# Patient Record
Sex: FEMALE | Race: WHITE | NOT HISPANIC OR LATINO | URBAN - NONMETROPOLITAN AREA
[De-identification: names, ages, dates, MRNs, and addresses within clinical notes are randomized per-mention and may not be internally consistent; named-entity substitution may affect disease eponyms.]

---

## 2023-03-07 RX ORDER — ATORVASTATIN CALCIUM 20 MG/1
20 TABLET, FILM COATED ORAL
COMMUNITY
Start: 2022-12-27

## 2023-03-10 ENCOUNTER — CONSULT (OUTPATIENT)
Dept: PAIN MEDICINE | Facility: CLINIC | Age: 58
End: 2023-03-10

## 2023-03-10 ENCOUNTER — HOSPITAL ENCOUNTER (OUTPATIENT)
Dept: RADIOLOGY | Facility: CLINIC | Age: 58
End: 2023-03-10

## 2023-03-10 VITALS
WEIGHT: 205.4 LBS | HEIGHT: 67 IN | TEMPERATURE: 98.9 F | SYSTOLIC BLOOD PRESSURE: 114 MMHG | DIASTOLIC BLOOD PRESSURE: 62 MMHG | BODY MASS INDEX: 32.24 KG/M2 | HEART RATE: 80 BPM | RESPIRATION RATE: 20 BRPM

## 2023-03-10 DIAGNOSIS — M47.816 LUMBAR SPONDYLOSIS: Primary | ICD-10-CM

## 2023-03-10 DIAGNOSIS — M46.1 SACROILIITIS (HCC): ICD-10-CM

## 2023-03-10 DIAGNOSIS — M47.816 LUMBAR SPONDYLOSIS: ICD-10-CM

## 2023-03-10 DIAGNOSIS — M96.1 POSTLAMINECTOMY SYNDROME: ICD-10-CM

## 2023-03-10 RX ORDER — LIDOCAINE HYDROCHLORIDE 10 MG/ML
5 INJECTION, SOLUTION EPIDURAL; INFILTRATION; INTRACAUDAL; PERINEURAL ONCE
Status: CANCELLED | OUTPATIENT
Start: 2023-03-10 | End: 2023-03-10

## 2023-03-10 RX ORDER — BUPIVACAINE HCL/PF 2.5 MG/ML
4 VIAL (ML) INJECTION ONCE
Status: CANCELLED | OUTPATIENT
Start: 2023-03-10 | End: 2023-03-10

## 2023-03-10 RX ORDER — METHYLPREDNISOLONE ACETATE 80 MG/ML
80 INJECTION, SUSPENSION INTRA-ARTICULAR; INTRALESIONAL; INTRAMUSCULAR; PARENTERAL; SOFT TISSUE ONCE
Status: CANCELLED | OUTPATIENT
Start: 2023-03-10 | End: 2023-03-10

## 2023-03-10 RX ORDER — DIPHENOXYLATE HYDROCHLORIDE AND ATROPINE SULFATE 2.5; .025 MG/1; MG/1
1 TABLET ORAL DAILY
COMMUNITY

## 2023-03-10 RX ORDER — DULOXETIN HYDROCHLORIDE 30 MG/1
30 CAPSULE, DELAYED RELEASE ORAL DAILY
Qty: 30 CAPSULE | Refills: 0 | Status: SHIPPED | OUTPATIENT
Start: 2023-03-10

## 2023-03-10 NOTE — PATIENT INSTRUCTIONS
Core Strengthening Exercises   WHAT YOU NEED TO KNOW:   Your core includes the muscles of your lower back, hip, pelvis, and abdomen  Core strengthening exercises help heal and strengthen these muscles  This helps prevent another injury, and keeps your pelvis, spine, and hips in the correct position  DISCHARGE INSTRUCTIONS:   Call your doctor or physical therapist if:   You have sharp or worsening pain during exercise or at rest     You have questions or concerns about your shoulder exercises  Safety tips:  Talk to your healthcare provider before you start an exercise program  A physical therapist can teach you how to do core strengthening exercises safely  Do the exercises on a mat or firm surface  A firm surface will support your spine and prevent low back pain  Do not do these exercises on a bed  Move slowly and smoothly  Avoid fast or jerky motions  Stop if you feel pain  You may feel some discomfort at first, but you should not feel pain  Tell your provider or physical therapist if you have pain while you exercise  Regular exercise will help decrease your discomfort over time  Breathe normally during core exercises  Do not hold your breath  This may cause an increase in blood pressure and prevent muscle strengthening  Your healthcare provider will tell you when to inhale and exhale during the exercise  Begin all of your exercises with abdominal bracing  Abdominal bracing helps warm up your core muscles  You can also practice abdominal bracing throughout the day  Lie on your back with your knees bent and feet flat on the floor  Place your arms in a relaxed position beside your body  Tighten your abdominal muscles  Pull your belly button in and up toward your spine  Hold for 5 seconds  Relax your muscles  Repeat 10 times  Core strengthening exercises: Your healthcare provider will tell you how often to do these exercises   The provider will also tell you how many repetitions of each exercise you should do  Hold each exercise for 5 seconds or as directed  As you get stronger, increase your hold to 10 to 15 seconds  You can do some of these exercises on a stability ball, or with a weight  Ask your healthcare provider how to use a stability ball or weight for these exercises:  Bridging:  Lie on your back with your knees bent and feet flat on the floor  Rest your arms at your side  Tighten your buttocks, and then lift your hips 1 inch off the floor  Hold for 5 seconds  When you can do this exercise without pain for 10 seconds, increase the distance you lift your hips  A good goal is to be able to lift your hips so that your shoulders, hips, and knees are in a straight line  Dead bug:  Lie on your back with your knees bent and feet flat on the floor  Place your arms in a relaxed position beside your body  Begin with abdominal bracing  Next, raise one leg, keeping your knee bent  Hold for 5 seconds  Repeat with the other leg  When you can do this exercise without pain for 10 to 15 seconds, you may raise one straight leg and hold  Repeat with the other leg  Quadruped:  Place your hands and knees on the floor  Keep your wrists directly below your shoulders and your knees directly below your hips  Pull your belly button in toward your spine  Do not flatten or arch your back  Tighten your abdominal muscles below your belly button  Hold for 5 seconds  When you can do this exercise without pain for 10 to 15 seconds, you may extend one arm and hold  Repeat on the other side  Side bridge exercises:      Standing side bridge:  Stand next to a wall and extend one arm toward the wall  Place your palm flat on the wall with your fingers pointing upward  Begin with abdominal bracing  Next, without moving your feet, slowly bend your arm to 90 degrees  Hold for 5 seconds  Repeat on the other side   When you can do this exercise without pain for 10 to 15 seconds, you may do the bent leg side bridge on the floor  Bent leg side bridge:  Lie on one side with your legs, hips, and shoulders in a straight line  Prop yourself up onto your forearm so your elbow is directly below your shoulder  Bend your knees back to 90 degrees  Begin with abdominal bracing  Next, lift your hips and balance yourself on your forearm and knees  Hold for 5 seconds  Repeat on the other side  When you can do this exercise without pain for 10 to 15 seconds, you may do the straight leg side bridge on the floor  Straight leg side bridge:  Lie on one side with your legs, hips, and shoulders in a straight line  Prop yourself up onto your forearm so your elbow is directly below your shoulder  Begin with abdominal bracing  Lift your hips off the floor and balance yourself on your forearm and the outside of your flexed foot  Do not let your ankle bend sideways  Hold for 5 seconds  Repeat on the other side  When you can do this exercise without pain for 10 to 15 seconds, ask your healthcare provider for more advanced exercises  Superman:  Lie on your stomach  Extend your arms forward on the floor  Tighten your abdominal muscles and lift your right hand and left leg off the floor  Hold this position  Slowly return to the starting position  Tighten your abdominal muscles and lift your left hand and right leg off the floor  Hold this position  Slowly return to the starting position  Clam:  Lie on your side with your knees bent  Put your bottom arm under your head to keep your neck in line  Put your top hand on your hip to keep your pelvis from moving  Put your heels together, and keep them together during this exercise  Slowly raise your top knee toward the ceiling  Then lower your leg so your knees are together  Repeat this exercise 10 times  Then switch sides and do the exercise 10 times with the other leg  Curl up:  Lie on your back with your knees bent and feet flat on the floor   Place your hands, palms down, underneath your lower back  Next, with your elbows on the floor, lift your shoulders and chest 2 to 3 inches off the floor  Keep your head in line with your shoulders  Hold this position  Slowly return to the starting position  Straight leg raises:  Lie on your back with one leg straight  Bend the other knee and place your foot flat on the floor  Tighten your abdominal muscles  Keep your leg straight and slowly lift it straight up 6 to 12 inches off the floor  Hold this position  Lower your leg slowly  Do as many repetitions as directed on this side  Repeat with the other leg  Plank:  Lie on your stomach  Bend your elbows and place your forearms flat on the floor  Lift your chest, stomach, and knees off the floor  Make sure your elbows are below your shoulders  Your body should be in a straight line  Do not let your hips or lower back sink to the ground  Squeeze your abdominal muscles together and hold for 15 seconds  To make this exercise harder, hold for 30 seconds or lift 1 leg at a time  Bicycles:  Lie on your back  Bend both knees and bring them toward your chest  Your calves should be parallel to the floor  Place the palms of your hands on the back of your head  Straighten your right leg and keep it lifted 2 inches off the floor  Raise your head and shoulders off the floor and twist towards your left  Keep your head and shoulders lifted  Bend your right knee while you straighten your left leg  Keep your left leg 2 inches off the floor  Twist your head and chest towards the left leg  Continue to straighten 1 leg at a time and twist        Follow up with your doctor or physical therapist as directed:  Write down your questions so you remember to ask them during your visits  © Copyright Tino Sprague 2022 Information is for End User's use only and may not be sold, redistributed or otherwise used for commercial purposes  The above information is an  only   It is not intended as medical advice for individual conditions or treatments  Talk to your doctor, nurse or pharmacist before following any medical regimen to see if it is safe and effective for you  Duloxetine (By mouth)   Duloxetine ([id number]-LOX-[id number])  Treats depression, anxiety, diabetic peripheral neuropathy, fibromyalgia, and chronic muscle or bone pain  This medicine is a SNRI  Brand Name(s): Cymbalta, Drizalma Sprinkle, Irenka   There may be other brand names for this medicine  When This Medicine Should Not Be Used: This medicine is not right for everyone  Do not use it if you had an allergic reaction to duloxetine  How to Use This Medicine:   Capsule, Delayed Release Capsule  Take your medicine as directed  Your dose may need to be changed several times to find what works best for you  Delayed-release capsule: Swallow the capsule whole  Do not crush, chew, break, or open it  Do not open the Cymbalta® delayed-release capsule and sprinkle the contents on food or in liquids  If you have trouble swallowing the Aldon Ort delayed release capsule: You may open the capsule and sprinkle the contents over one tablespoon (15 mL) of applesauce  Swallow the mixture right away and do not save any of the mixture to use later  You may open the capsule and pour the contents to an all plastic catheter tip syringe and add 50 mL of water  Do not use other liquids  Gently shake it for 10 seconds, and then use it through a nasogastric tube  Rinse with additional water (about 15 mL) if needed  This medicine should come with a Medication Guide  Ask your pharmacist for a copy if you do not have one  Missed dose: Take a dose as soon as you remember  If it is almost time for your next dose, wait until then and take a regular dose  Do not take extra medicine to make up for a missed dose  Store the medicine in a closed container at room temperature, away from heat, moisture, and direct light    Drugs and Foods to Avoid: Ask your doctor or pharmacist before using any other medicine, including over-the-counter medicines, vitamins, and herbal products  Do not take duloxetine if you have used an MAO inhibitor (MAOI) within the past 14 days  Do not start taking an MAO inhibitor within 5 days of stopping duloxetine  Ask your doctor if you are not sure if you take an MAOI, including linezolid or methylene blue injection  Some medicines can affect how duloxetine works  Tell your doctor if you are using any of the following:  Buspirone, cimetidine, ciprofloxacin, enoxacin, fentanyl, fluvoxamine, lithium, Darlin's wort, theophylline, tramadol, tryptophan, warfarin  Amphetamines  Blood pressure medicine  Diuretic (water pill)  Medicine for heart rhythm problems (including flecainide, propafenone, quinidine)  Medicine to treat migraine headaches (including triptans)  NSAID pain or arthritis medicine (including aspirin, celecoxib, diclofenac, ibuprofen, naproxen)  Other medicine to treat depression or mood disorders (including amitriptyline, desipramine, fluoxetine, imipramine, nortriptyline, paroxetine)  Phenothiazine medicine (including thioridazine)  Stomach medicine (including famotidine, antacids containing aluminum or magnesium, PPIs)  Tell your doctor if you use anything else that makes you sleepy  Some examples are allergy medicine, narcotic pain medicine, and alcohol  Do not drink alcohol while you are using this medicine  Warnings While Using This Medicine:   Tell your doctor if you are pregnant or breastfeeding, or if you have kidney disease, liver disease, bleeding problems, diabetes, digestion problems, glaucoma, heart disease, high or low blood pressure, or problems with urination  Tell your doctor if you smoke or you have a history of seizures, mental health problems (including bipolar disorder, brittney), or drug or alcohol addiction    This medicine may cause the following problems:   Serious liver problems  Serotonin syndrome, when used with certain medicines  Increased risk of bleeding problems  Serious skin reactions, including erythema multiforme, Cerrato-Frank syndrome  Low sodium levels in the blood  Sexual problems  This medicine can increase thoughts of suicide  Tell your doctor right away if you start to feel depressed and have thoughts about hurting yourself  This medicine may cause blurred vision, dizziness, drowsiness, trouble with thinking, or trouble with controlling body movements, which may lead to falls, fractures, or other injuries  Do not drive or do anything that could be dangerous until you know how this medicine affects you  Stand up slowly to avoid falls  Do not stop using this medicine suddenly  Your doctor will need to slowly decrease your dose before you stop it completely  Your doctor will check your progress and the effects of this medicine at regular visits  Keep all appointments  Keep all medicine out of the reach of children  Never share your medicine with anyone  Possible Side Effects While Using This Medicine:   Call your doctor right away if you notice any of these side effects:   Allergic reaction: Itching or hives, swelling in your face or hands, swelling or tingling in your mouth or throat, chest tightness, trouble breathing  Anxiety, restlessness, fever, fast heartbeat, sweating, muscle spasms, diarrhea, seeing or hearing things that are not there  Blistering, peeling, red skin rash  Confusion, weakness, muscle twitching  Dark urine or pale stools, nausea, vomiting, loss of appetite, stomach pain, yellow skin or eyes  Decrease in how much or how often you urinate  Decrease in sexual ability, desire, drive, or performance  Eye pain, vision changes, seeing halos around lights  Feeling more energetic than usual  Lightheadedness, dizziness, fainting  Unusual moods or behaviors, worsening depression, thoughts about hurting yourself, trouble sleeping  Unusual bleeding or bruising  If you notice these less serious side effects, talk with your doctor:   Decrease in appetite or weight  Dry mouth, constipation, mild nausea  Headache  Unusual drowsiness, sleepiness, or tiredness  If you notice other side effects that you think are caused by this medicine, tell your doctor  Call your doctor for medical advice about side effects  You may report side effects to FDA at 2-441-FDA-0466  © Copyright Brandy Manner 2022 Information is for End User's use only and may not be sold, redistributed or otherwise used for commercial purposes  The above information is an  only  It is not intended as medical advice for individual conditions or treatments  Talk to your doctor, nurse or pharmacist before following any medical regimen to see if it is safe and effective for you

## 2023-03-10 NOTE — PROGRESS NOTES
Assessment  1  Lumbar spondylosis  -     DULoxetine (CYMBALTA) 30 mg delayed release capsule; Take 1 capsule (30 mg total) by mouth daily  -     MRI lumbar spine without contrast; Future; Expected date: 03/10/2023  -     XR spine lumbar complete w bending minimum 6 views; Future; Expected date: 03/10/2023    2  Postlaminectomy syndrome  -     XR spine lumbar complete w bending minimum 6 views; Future; Expected date: 03/10/2023    3  Sacroiliitis (HCC)    Axial left low back pain described primarily by arthritic features  Aching, nagging, indolent, stabbing, throbbing features in axial left low back without radicular components  5/5 strength bilaterally, negative SLR  Positive facet loading maneuvers in lumbar spine elicited pain, positive tenderness to palpation over lumbar paraspinal muscles  Positive iram's, gaenslen's, SIJ loading left sided as well as ritesh finger and compression test   Prior L4-L5 fusion in 2016  Currently she is neurologically intact without gait instability, saddle anesthesia or bowel/bladder abnormality  Risks, benefits alternative to Left SIJ injection thoroughly discussed with patient  Handouts provided questions answered to patient satisfaction  Will additionally obtain imaging to rule out hardware instability or new disc degeneration at L5-S1  Plan  -Left SIJ injection; f/u 2 weeks post procedure  -xray, MRI lumbar spine noncontrast; will f/u result with patient  -duloxetine 30mg/day ordered for patient; counseled regarding sedative effects of taking this medication and provided up titration calendar  Counseled not to take medication while driving or operating heavy machinery/using stairs  -Physician directed home exercise plan as per [facility] demonstrated and handouts provided that patient plans to participate with for 1 hour, twice a week for the next 6 weeks  There are risks associated with opioid medications, including dependence, addiction and tolerance   The patient understands and agrees to use these medications only as prescribed  Potential side effects of the medications include, but are not limited to, constipation, drowsiness, addiction, impaired judgment and risk of fatal overdose if not taken as prescribed  The patient was warned against driving while taking sedation medications or operating heavy machinery  The patient voiced understanding  Sharing medications is a felony  At this point in time, the patient is showing no signs of addiction, abuse, diversion or suicidal ideation  [place] Prescription Drug Monitoring Program report was reviewed and was appropriate      Complete risks and benefits including bleeding, infection, tissue reaction, nerve injury and allergic reaction were discussed  The approach was demonstrated using models and literature was provided  Verbal and written consent was obtained  My impressions and treatment recommendations were discussed in detail with the patient who verbalized understanding and had no further questions  Discharge instructions were provided  I personally saw and examined the patient and I agree with the above discussed plan of care  New Medications Ordered This Visit   Medications   • atorvastatin (LIPITOR) 20 mg tablet     Sig: Take 20 mg by mouth   • rivaroxaban (XARELTO) 10 mg tablet     Sig: Take 1 tablet by mouth daily   • multivitamin (THERAGRAN) TABS     Sig: Take 1 tablet by mouth daily   • DULoxetine (CYMBALTA) 30 mg delayed release capsule     Sig: Take 1 capsule (30 mg total) by mouth daily     Dispense:  30 capsule     Refill:  0       History of Present Illness    Dylan Mello is a 61 y o  female with pmhx of factor V leiden deficiency on xarelto (DVT, PE in 2014, prior L4-L5 fusion in presenting with chronic left low back pain described primarily as arthritic in nature  She describes 8/10 low back pain that is worse in the mornings and worse at the end of the day    The pain is characterized by achy, nagging, indolent, crampy, stabbing pain in her left sided axial low back  The patient describes that the pain is worse with standing for long periods of time on hard surfaces as well as with walking  The patient is a very active individual and feels as though this pain compromises his/her participation with independent activities of daily living  The pain can be debilitating at times and contribute to significant disability, compromising overall activity and independent activities of daily living  She has tried physical therapy with limited relief of symptoms  Medications the patient has tried in the past include nsaids, tylenol  She describes no radicular symptoms and has good strength  She denies any weakness numbness or paresthesias  The patient denies any bowel or bladder dysfunction as well, saddle anesthesia or gait instability  I have personally reviewed and/or updated the patient's past medical history, past surgical history, family history, social history, current medications, allergies, and vital signs today  Review of Systems   Constitutional: Positive for activity change  HENT: Negative  Eyes: Negative  Respiratory: Negative  Cardiovascular: Negative  Gastrointestinal: Negative  Endocrine: Negative  Genitourinary: Negative  Musculoskeletal: Positive for arthralgias, back pain and myalgias  Negative for gait problem  Skin: Negative  Allergic/Immunologic: Negative  Neurological: Negative for weakness and numbness  Hematological: Negative  Psychiatric/Behavioral: Negative  All other systems reviewed and are negative  There is no problem list on file for this patient  History reviewed  No pertinent past medical history  Past Surgical History:   Procedure Laterality Date   • IR DVT THROMBOLYSIS/THROMBECTOMY LOWER EXTREMITY WITH VENOGRAM         History reviewed  No pertinent family history      Social History     Occupational History   • Not on file   Tobacco Use   • Smoking status: Never   • Smokeless tobacco: Not on file   Vaping Use   • Vaping Use: Never used   Substance and Sexual Activity   • Alcohol use: Yes     Comment: social   • Drug use: Never   • Sexual activity: Not on file       Current Outpatient Medications on File Prior to Visit   Medication Sig   • atorvastatin (LIPITOR) 20 mg tablet Take 20 mg by mouth   • multivitamin (THERAGRAN) TABS Take 1 tablet by mouth daily   • rivaroxaban (XARELTO) 10 mg tablet Take 1 tablet by mouth daily     No current facility-administered medications on file prior to visit  Allergies   Allergen Reactions   • Ampicillin Rash   • Sulfa Antibiotics Rash       Physical Exam    /62   Pulse 80   Temp 98 9 °F (37 2 °C)   Resp 20   Ht 5' 6 5" (1 689 m)   Wt 93 2 kg (205 lb 6 4 oz)   BMI 32 66 kg/m²     Constitutional: normal, well developed, well nourished, alert, in no distress and non-toxic and no overt pain behavior  and obese  Eyes: anicteric  HEENT: grossly intact  Neck: supple, symmetric, trachea midline and no masses   Pulmonary:even and unlabored  Cardiovascular:No edema or pitting edema present  Skin:Normal without rashes or lesions and well hydrated  Psychiatric:Mood and affect appropriate  Neurologic:Cranial Nerves II-XII grossly intact Sensation grossly intact; no clonus negative piper's  Reflexes 2+ and brisk  SLR negative bilaterally  Musculoskeletal:normal gait  5/5 strength bilaterally with AROM in lower extremities  Normal heel toe and tip toe walking  Significant pain with lumbar facet loading bilaterally and with lateral spine rotation  No ttp over lumbar paraspinal muscles  Positive iram's test,  gaenslen's SIJ loading left sided bilaterally      Imaging    No pertinent imaging available to review

## 2023-03-10 NOTE — H&P (VIEW-ONLY)
Assessment  1  Lumbar spondylosis  -     DULoxetine (CYMBALTA) 30 mg delayed release capsule; Take 1 capsule (30 mg total) by mouth daily  -     MRI lumbar spine without contrast; Future; Expected date: 03/10/2023  -     XR spine lumbar complete w bending minimum 6 views; Future; Expected date: 03/10/2023    2  Postlaminectomy syndrome  -     XR spine lumbar complete w bending minimum 6 views; Future; Expected date: 03/10/2023    3  Sacroiliitis (HCC)    Axial left low back pain described primarily by arthritic features  Aching, nagging, indolent, stabbing, throbbing features in axial left low back without radicular components  5/5 strength bilaterally, negative SLR  Positive facet loading maneuvers in lumbar spine elicited pain, positive tenderness to palpation over lumbar paraspinal muscles  Positive iram's, gaenslen's, SIJ loading left sided as well as ritesh finger and compression test   Prior L4-L5 fusion in 2016  Currently she is neurologically intact without gait instability, saddle anesthesia or bowel/bladder abnormality  Risks, benefits alternative to Left SIJ injection thoroughly discussed with patient  Handouts provided questions answered to patient satisfaction  Will additionally obtain imaging to rule out hardware instability or new disc degeneration at L5-S1  Plan  -Left SIJ injection; f/u 2 weeks post procedure  -xray, MRI lumbar spine noncontrast; will f/u result with patient  -duloxetine 30mg/day ordered for patient; counseled regarding sedative effects of taking this medication and provided up titration calendar  Counseled not to take medication while driving or operating heavy machinery/using stairs  -Physician directed home exercise plan as per AAOS demonstrated and handouts provided that patient plans to participate with for 1 hour, twice a week for the next 6 weeks  There are risks associated with opioid medications, including dependence, addiction and tolerance   The patient understands and agrees to use these medications only as prescribed  Potential side effects of the medications include, but are not limited to, constipation, drowsiness, addiction, impaired judgment and risk of fatal overdose if not taken as prescribed  The patient was warned against driving while taking sedation medications or operating heavy machinery  The patient voiced understanding  Sharing medications is a felony  At this point in time, the patient is showing no signs of addiction, abuse, diversion or suicidal ideation  [facility] Prescription Drug Monitoring Program report was reviewed and was appropriate      Complete risks and benefits including bleeding, infection, tissue reaction, nerve injury and allergic reaction were discussed  The approach was demonstrated using models and literature was provided  Verbal and written consent was obtained  My impressions and treatment recommendations were discussed in detail with the patient who verbalized understanding and had no further questions  Discharge instructions were provided  I personally saw and examined the patient and I agree with the above discussed plan of care  New Medications Ordered This Visit   Medications   • atorvastatin (LIPITOR) 20 mg tablet     Sig: Take 20 mg by mouth   • rivaroxaban (XARELTO) 10 mg tablet     Sig: Take 1 tablet by mouth daily   • multivitamin (THERAGRAN) TABS     Sig: Take 1 tablet by mouth daily   • DULoxetine (CYMBALTA) 30 mg delayed release capsule     Sig: Take 1 capsule (30 mg total) by mouth daily     Dispense:  30 capsule     Refill:  0       History of Present Illness    Daniela Arias is a 61 y o  female with pmhx of factor V leiden deficiency on xarelto (DVT, PE in 2014, prior L4-L5 fusion in presenting with chronic left low back pain described primarily as arthritic in nature  She describes 8/10 low back pain that is worse in the mornings and worse at the end of the day    The pain is characterized by achy, nagging, indolent, crampy, stabbing pain in her left sided axial low back  The patient describes that the pain is worse with standing for long periods of time on hard surfaces as well as with walking  The patient is a very active individual and feels as though this pain compromises his/her participation with independent activities of daily living  The pain can be debilitating at times and contribute to significant disability, compromising overall activity and independent activities of daily living  She has tried physical therapy with limited relief of symptoms  Medications the patient has tried in the past include nsaids, tylenol  She describes no radicular symptoms and has good strength  She denies any weakness numbness or paresthesias  The patient denies any bowel or bladder dysfunction as well, saddle anesthesia or gait instability  I have personally reviewed and/or updated the patient's past medical history, past surgical history, family history, social history, current medications, allergies, and vital signs today  Review of Systems   Constitutional: Positive for activity change  HENT: Negative  Eyes: Negative  Respiratory: Negative  Cardiovascular: Negative  Gastrointestinal: Negative  Endocrine: Negative  Genitourinary: Negative  Musculoskeletal: Positive for arthralgias, back pain and myalgias  Negative for gait problem  Skin: Negative  Allergic/Immunologic: Negative  Neurological: Negative for weakness and numbness  Hematological: Negative  Psychiatric/Behavioral: Negative  All other systems reviewed and are negative  There is no problem list on file for this patient  History reviewed  No pertinent past medical history  Past Surgical History:   Procedure Laterality Date   • IR DVT THROMBOLYSIS/THROMBECTOMY LOWER EXTREMITY WITH VENOGRAM         History reviewed  No pertinent family history      Social History     Occupational History   • Not on file   Tobacco Use   • Smoking status: Never   • Smokeless tobacco: Not on file   Vaping Use   • Vaping Use: Never used   Substance and Sexual Activity   • Alcohol use: Yes     Comment: social   • Drug use: Never   • Sexual activity: Not on file       Current Outpatient Medications on File Prior to Visit   Medication Sig   • atorvastatin (LIPITOR) 20 mg tablet Take 20 mg by mouth   • multivitamin (THERAGRAN) TABS Take 1 tablet by mouth daily   • rivaroxaban (XARELTO) 10 mg tablet Take 1 tablet by mouth daily     No current facility-administered medications on file prior to visit  Allergies   Allergen Reactions   • Ampicillin Rash   • Sulfa Antibiotics Rash       Physical Exam    /62   Pulse 80   Temp 98 9 °F (37 2 °C)   Resp 20   Ht 5' 6 5" (1 689 m)   Wt 93 2 kg (205 lb 6 4 oz)   BMI 32 66 kg/m²     Constitutional: normal, well developed, well nourished, alert, in no distress and non-toxic and no overt pain behavior  and obese  Eyes: anicteric  HEENT: grossly intact  Neck: supple, symmetric, trachea midline and no masses   Pulmonary:even and unlabored  Cardiovascular:No edema or pitting edema present  Skin:Normal without rashes or lesions and well hydrated  Psychiatric:Mood and affect appropriate  Neurologic:Cranial Nerves II-XII grossly intact Sensation grossly intact; no clonus negative piper's  Reflexes 2+ and brisk  SLR negative bilaterally  Musculoskeletal:normal gait  5/5 strength bilaterally with AROM in lower extremities  Normal heel toe and tip toe walking  Significant pain with lumbar facet loading bilaterally and with lateral spine rotation  No ttp over lumbar paraspinal muscles  Positive iram's test,  gaenslen's SIJ loading left sided bilaterally      Imaging    No pertinent imaging available to review

## 2023-03-13 ENCOUNTER — TELEPHONE (OUTPATIENT)
Dept: PAIN MEDICINE | Facility: CLINIC | Age: 58
End: 2023-03-13

## 2023-03-13 NOTE — TELEPHONE ENCOUNTER
Caller: [facility]# [phone]    Reason: Please fax MRI referral    Fax# [phone]

## 2023-03-15 ENCOUNTER — TELEPHONE (OUTPATIENT)
Dept: RADIOLOGY | Facility: CLINIC | Age: 58
End: 2023-03-15

## 2023-03-15 NOTE — DISCHARGE INSTR - AVS FIRST PAGE
YOUR 2 WEEK FOLLOW UP HAS BEEN SCHEDULED; IF YOU WISH TO CHANGE THE FOLLOW UP, PLEASE CALL THE [facility] AT [facility]: [phone]  Sacroiliac Joint Injection   WHAT YOU NEED TO KNOW:   A sacroiliac (SI) joint injection is done to diagnose or treat pain from sacroiliac joint syndrome  The pain caused by this syndrome may be felt in your lower back, buttocks, groin, and your thigh  DISCHARGE INSTRUCTIONS:   Seek care immediately if:   You have a fever  You have increased redness, or swelling around the injection site  You have drainage from the injection site  You are not able to walk or move your leg  Contact your healthcare provider if:   Your pain does not get better within 5 days  You have new symptoms  You have questions or concerns about your condition or care  Self-care:   Drink liquids as directed  Liquids will help flush the contrast material out of your body  Ask how much liquid to drink and which liquids are best for you  Apply ice to your injection site  Ice helps decrease swelling and pain  Use an ice pack, or put crushed ice in a plastic bag  Cover it with a towel and place it on your low back for 15 to 20 minutes every hour or as directed  Do not take a bath or get into a hot tub after your procedure  Take a shower instead  Soaking your puncture site in a bath or hot tub increases your risk for infection  Limit physical activity that causes pain  Rest as needed  Ask your healthcare provider how long you should limit activity  Keep a pain diary  Write down when your pain happens, how severe it is, and any other symptoms you have with your pain  A diary will help you keep track of your pain  It may also help your healthcare provider find out what is causing your pain  Follow up with your healthcare provider as directed: You may need more injections or other treatments  Bring your pain diary to your visits   Write down your questions so you remember to ask them during your visits  © Copyright Rosalva Bernard 2022 Information is for End User's use only and may not be sold, redistributed or otherwise used for commercial purposes  The above information is an  only  It is not intended as medical advice for individual conditions or treatments  Talk to your doctor, nurse or pharmacist before following any medical regimen to see if it is safe and effective for you

## 2023-03-15 NOTE — TELEPHONE ENCOUNTER
S/w pt, informed about xray result of the lumbar spine   Pt has ov scheduled 4/10 and procedure tomorrow with VS

## 2023-03-15 NOTE — TELEPHONE ENCOUNTER
----- Message from Martin Wood MD sent at 3/15/2023 12:21 PM EDT -----  No spinal instability noted on recent xrays; will f/u after PT, MRI; please let patient know, thanks  ----- Message -----  From: Interface, Radiology Results In  Sent: 3/15/2023   6:05 AM EDT  To: Martin Wood MD

## 2023-03-16 ENCOUNTER — APPOINTMENT (OUTPATIENT)
Dept: RADIOLOGY | Facility: HOSPITAL | Age: 58
End: 2023-03-16

## 2023-03-16 ENCOUNTER — HOSPITAL ENCOUNTER (OUTPATIENT)
Facility: HOSPITAL | Age: 58
Setting detail: OUTPATIENT SURGERY
Discharge: HOME | End: 2023-03-16
Attending: ANESTHESIOLOGY | Admitting: ANESTHESIOLOGY

## 2023-03-16 VITALS
HEART RATE: 85 BPM | DIASTOLIC BLOOD PRESSURE: 84 MMHG | OXYGEN SATURATION: 96 % | HEIGHT: 67 IN | RESPIRATION RATE: 18 BRPM | WEIGHT: 205 LBS | BODY MASS INDEX: 32.18 KG/M2 | TEMPERATURE: 98 F | SYSTOLIC BLOOD PRESSURE: 141 MMHG

## 2023-03-16 RX ORDER — LIDOCAINE HYDROCHLORIDE 10 MG/ML
INJECTION, SOLUTION EPIDURAL; INFILTRATION; INTRACAUDAL; PERINEURAL AS NEEDED
Status: DISCONTINUED | OUTPATIENT
Start: 2023-03-16 | End: 2023-03-16 | Stop reason: HOSPADM

## 2023-03-16 RX ORDER — LIDOCAINE HYDROCHLORIDE 10 MG/ML
5 INJECTION, SOLUTION EPIDURAL; INFILTRATION; INTRACAUDAL; PERINEURAL ONCE
Status: DISCONTINUED | OUTPATIENT
Start: 2023-03-16 | End: 2023-03-16 | Stop reason: HOSPADM

## 2023-03-16 RX ORDER — METHYLPREDNISOLONE ACETATE 80 MG/ML
80 INJECTION, SUSPENSION INTRA-ARTICULAR; INTRALESIONAL; INTRAMUSCULAR; PARENTERAL; SOFT TISSUE ONCE
Status: COMPLETED | OUTPATIENT
Start: 2023-03-16 | End: 2023-03-16

## 2023-03-16 RX ORDER — BUPIVACAINE HCL/PF 2.5 MG/ML
4 VIAL (ML) INJECTION ONCE
Status: COMPLETED | OUTPATIENT
Start: 2023-03-16 | End: 2023-03-16

## 2023-03-16 NOTE — INTERVAL H&P NOTE
H&P reviewed  After examining the patient I find no changes in the patients condition since the H&P had been written      Vitals:    03/16/23 1048   BP: 149/78   Pulse: 96   Resp: 18   Temp: 97 8 °F (36 6 °C)   SpO2: 97%

## 2023-03-16 NOTE — PROCEDURES
Pre-procedure Diagnosis: Sacroiliitis/Sacroiliac joint dysfunction  Post-procedure Diagnosis: Sacroiliitis/Sacroiliac joint dysfunction  Operation Title(s):  1  [LEFT] Sacroiliac joint injection      2  Intraoperative fluoroscopy  Attending Surgeon:   Yesica Terry MD  Anesthesia:   Local    Indications: The patient is a 61y o  year-old female with a diagnosis of sacroiliitis/Sacroiliac joint dysfunction  The patient's history and physical exam were reviewed  The risks, benefits and alternatives to the procedure were discussed, and all questions were answered to the patient's satisfaction  The patient agreed to proceed, and written informed consent was obtained  Procedure in Detail: The patient was brought into the procedure room and placed in the prone position on the fluoroscopy table  The low back and upper buttock were prepped with chloraprep and draped in a sterile manner  AP fluoroscopy was used to visualize the [LEFT] sacroiliac joint  The fluoroscopic beam was then obliqued until the anterior and posterior margins of the joint were aligned  The inferior margin of the joint was identified and marked  The skin and subcutaneous tissues in the area were anesthetized with 1% lidocaine  A 22-gauge, 3½-inch spinal needle was advanced toward the identified point under fluoroscopic guidance  Once the targeted point was reached and the joint space was entered, negative aspiration was confirmed, and 1ml of Omnipaque 240 contrast was injected  The joint space was appropriately outlined  Then, after negative aspiration, a solution consisting of 4mL 0 25% bupivacaine and 1mL Depo-Medrol (80mg/ml) were easily injected  The needle was removed with a 1% lidocaine flush  The patient's back was cleaned and a bandage was placed over the sites of needle insertion  Disposition: The patient tolerated the procedure well and there were no apparent complications   Vital signs remained stable throughout the procedure  The patient was taken to the recovery area where written discharge instructions for the procedure were given      Estimated Blood Loss: None  Specimens Obtained: N/A

## 2023-03-16 NOTE — OP NOTE
OPERATIVE REPORT  PATIENT NAME: Jany Silverman    :  1965  MRN: [id number]  Pt Location:  GI ROOM 01    SURGERY DATE: 3/16/2023    Surgeon(s) and Role:      Gloria Leal MD - Primary    Preop Diagnosis:  Sacroiliitis ([address] ) [M46 1]    Post-Op Diagnosis Codes:     * Sacroiliitis ([address] ) [M46 1]    Procedure(s):  Left - BLOCK / INJECTION SACROILIAC    Specimen(s):  * No specimens in log *    Estimated Blood Loss:   Minimal    Drains:  * No LDAs found *    Anesthesia Type:   Local    Operative Indications:  Sacroiliitis ([address] ) [M46 1]    Operative Findings:  Contrast spread into left sacroiliac joint under fluoroscopic guidance    Complications:   None    Procedure and Technique:  Please see detailed procedure note    I was present for the entire procedure    Patient Disposition:  PACU     SIGNATURE: Odessa Bedolla MD  DATE: 2023  TIME: 11:31 AM

## 2023-03-20 NOTE — TELEPHONE ENCOUNTER
Caller: Vinny Ryan [facility]# [phone]     Reason: Harvinder Pearson calling stating MRI Auth is required and would like to speak to    Please advise      Fax# [phone]

## 2023-03-23 ENCOUNTER — TELEPHONE (OUTPATIENT)
Dept: RADIOLOGY | Facility: MEDICAL CENTER | Age: 58
End: 2023-03-23

## 2023-04-10 PROBLEM — M54.16 LUMBAR RADICULOPATHY: Status: ACTIVE | Noted: 2023-04-10

## 2023-04-25 ENCOUNTER — TELEPHONE (OUTPATIENT)
Dept: PAIN MEDICINE | Facility: CLINIC | Age: 58
End: 2023-04-25

## 2023-05-03 ENCOUNTER — OFFICE VISIT (OUTPATIENT)
Dept: PAIN MEDICINE | Facility: CLINIC | Age: 58
End: 2023-05-03

## 2023-05-03 VITALS
DIASTOLIC BLOOD PRESSURE: 78 MMHG | WEIGHT: 204.6 LBS | BODY MASS INDEX: 34.09 KG/M2 | RESPIRATION RATE: 20 BRPM | TEMPERATURE: 98.1 F | HEIGHT: 65 IN | SYSTOLIC BLOOD PRESSURE: 124 MMHG

## 2023-05-03 DIAGNOSIS — M54.16 LUMBAR RADICULOPATHY: Primary | ICD-10-CM

## 2023-05-03 NOTE — PROGRESS NOTES
Assessment  1  Lumbar radiculopathy      Greater than 80% relief of pain with improved ability to participate with IADLs after ILESI at L5-S1  Previously reported the following symptomatology:     Axial left low back pain described primarily by arthritic features  Aching, nagging, indolent, stabbing, throbbing features in axial left low back with bilateral L5 and S1 radicular (left greater than right) components  5/5 strength bilaterally, negative SLR  Positive facet loading maneuvers in lumbar spine elicited pain, positive tenderness to palpation over lumbar paraspinal muscles  Positive iram's, gaenslen's, SIJ loading left sided as well as ritesh finger and compression test   Prior L4-L5 fusion in 2016  On MRI at L3-L4 moderate central canal stenosis with mild bilateral neuroforaminal stenosis, at L4-L5 grade 1 spondylolisthesis of L4 on L5 with postsurgical changes, posterior central disc herniation  At L5-S1 a left central disc herniation leading to mild left sided neural foraminal stenosis, and small synovial cyst abutting left S1 spinal nerve  Currently she is neurologically intact without gait instability, saddle anesthesia or bowel/bladder abnormality  Risks, benefits alternative to ILESI thoroughly discussed with patient  Handouts provided questions answered to patient satisfaction  Will additionally obtain imaging to rule out hardware instability or new disc degeneration at L5-S1  Plan  -f/u prn  -gabapentin 300mg TID ordered for patient; counseled regarding sedative effects of taking this medication and provided up titration calendar  Counseled not to take medication while driving or operating heavy machinery/using stairs  -Physician directed home exercise plan as per AAOS demonstrated and handouts provided that patient plans to participate with for 1 hour, twice a week for the next 6 weeks  There are risks associated with opioid medications, including dependence, addiction and tolerance  The patient understands and agrees to use these medications only as prescribed  Potential side effects of the medications include, but are not limited to, constipation, drowsiness, addiction, impaired judgment and risk of fatal overdose if not taken as prescribed  The patient was warned against driving while taking sedation medications or operating heavy machinery  The patient voiced understanding  Sharing medications is a felony  At this point in time, the patient is showing no signs of addiction, abuse, diversion or suicidal ideation  [place] Prescription Drug Monitoring Program report was reviewed and was appropriate      Complete risks and benefits including bleeding, infection, tissue reaction, nerve injury and allergic reaction were discussed  The approach was demonstrated using models and literature was provided  Verbal and written consent was obtained  My impressions and treatment recommendations were discussed in detail with the patient who verbalized understanding and had no further questions  Discharge instructions were provided  I personally saw and examined the patient and I agree with the above discussed plan of care  No orders of the defined types were placed in this encounter  History of Present Illness    Greater than 80% relief of pain with improved ability to participate with IADLs after ILESI at L5-S1  Previously reported the following symptomatology:     Melinda Rowe is a 61 y o  female with pmhx of factor V leiden deficiency on xarelto (DVT, PE in 2014, prior L4-L5 fusion in presenting with chronic left greater than right low back pain described primarily as arthritic in nature  She describes 8/10 low back pain that is worse in the mornings and worse at the end of the day  The pain is characterized by achy, nagging, indolent, crampy, stabbing pain in her left sided axial low back    The patient describes that the pain is worse with standing for long periods of time on hard surfaces as well as with walking  The patient is a very active individual and feels as though this pain compromises her participation with independent activities of daily living  The pain can be debilitating at times and contribute to significant disability, compromising overall activity and independent activities of daily living  She has tried physical therapy with limited relief of symptoms  Medications the patient has tried in the past include nsaids, tylenol  She describes no radicular symptoms and has good strength  She endoreses pain limited weakness in bilateral L5 and S1 dermatomes accompanied by numbness and paresthesias  The patient denies any bowel or bladder dysfunction as well, saddle anesthesia or gait instability  I have personally reviewed and/or updated the patient's past medical history, past surgical history, family history, social history, current medications, allergies, and vital signs today  Review of Systems   Constitutional: Positive for activity change  HENT: Negative  Eyes: Negative  Respiratory: Negative  Cardiovascular: Negative  Gastrointestinal: Negative  Endocrine: Negative  Genitourinary: Negative  Musculoskeletal: Positive for arthralgias, back pain and myalgias  Negative for gait problem  Skin: Negative  Allergic/Immunologic: Negative  Neurological: Negative for weakness and numbness  Hematological: Negative  Psychiatric/Behavioral: Negative  All other systems reviewed and are negative        Patient Active Problem List   Diagnosis    Postlaminectomy syndrome    Lumbar spondylosis    Sacroiliitis (HCC)    Lumbar radiculopathy       Past Medical History:   Diagnosis Date    Back pain     DVT (deep vein thrombosis) in pregnancy     [facility]     High cholesterol     Pulmonary embolism (HCC)        Past Surgical History:   Procedure Laterality Date    BACK SURGERY      EPIDURAL BLOCK "INJECTION N/A 4/18/2023    Procedure: BLOCK / INJECTION EPIDURAL STEROID LUMBAR L5-S1;  Surgeon: Al Rodríguez MD;  Location: [facility];  Service: Pain Management     IR DVT THROMBOLYSIS/THROMBECTOMY LOWER EXTREMITY WITH VENOGRAM      NE INJECT SI JOINT ARTHRGRPHY&/ANES/STEROID W/RYDER Left 03/16/2023    Procedure: BLOCK / INJECTION SACROILIAC;  Surgeon: Al Rodríguez MD;  Location: [facility];  Service: Pain Management        History reviewed  No pertinent family history  Social History     Occupational History    Not on file   Tobacco Use    Smoking status: Never    Smokeless tobacco: Never   Vaping Use    Vaping Use: Never used   Substance and Sexual Activity    Alcohol use: Yes     Comment: social    Drug use: Never    Sexual activity: Not on file       Current Outpatient Medications on File Prior to Visit   Medication Sig    atorvastatin (LIPITOR) 20 mg tablet Take 20 mg by mouth    multivitamin (THERAGRAN) TABS Take 1 tablet by mouth daily    rivaroxaban (XARELTO) 10 mg tablet Take 1 tablet by mouth daily    gabapentin (NEURONTIN) 300 mg capsule Take 1 capsule (300 mg total) by mouth 3 (three) times a day (Patient not taking: Reported on 5/3/2023)     No current facility-administered medications on file prior to visit  Allergies   Allergen Reactions    Ampicillin Rash    Sulfa Antibiotics Rash       Physical Exam    /78   Temp 98 1 °F (36 7 °C)   Resp 20   Ht 5' 5\" (1 651 m)   Wt 92 8 kg (204 lb 9 6 oz)   BMI 34 05 kg/m²     Constitutional: normal, well developed, well nourished, alert, in no distress and non-toxic and no overt pain behavior   and obese  Eyes: anicteric  HEENT: grossly intact  Neck: supple, symmetric, trachea midline and no masses   Pulmonary:even and unlabored  Cardiovascular:No edema or pitting edema present  Skin:Normal without rashes or lesions and well hydrated  Psychiatric:Mood and affect appropriate  Neurologic:Cranial Nerves II-XII grossly intact " Sensation grossly intact; no clonus negative piper's  Reflexes 2+ and brisk  SLR negative bilaterally  Musculoskeletal:normal gait  5/5 strength bilaterally with AROM in lower extremities  Normal heel toe and tip toe walking  Significant pain with lumbar facet loading bilaterally and with lateral spine rotation  No ttp over lumbar paraspinal muscles  Positive iram's test,  gaenslen's SIJ loading left sided bilaterally  Imaging     On MRI at L3-L4 moderate central canal stenosis with mild bilateral neuroforaminal stenosis, at L4-L5 grade 1 spondylolisthesis of L4 on L5 with postsurgical changes, posterior central disc herniation  At L5-S1 a left central disc herniation leading to mild left sided neural foraminal stenosis, and small synovial cyst abutting left S1 spinal nerve

## 2023-05-03 NOTE — PATIENT INSTRUCTIONS
Spinal Cord Stimulator Placement   WHAT YOU NEED TO KNOW:   What do I need to know about spinal cord stimulator placement? A spinal cord stimulator (SCS) is a device used to control pain after other treatments have not worked  The SCS delivers a small amount of electrical current to your spinal cord to block pain  SCS placement surgery is done in 2 stages  In the first stage, a temporary SCS is placed and left in for about a week  In the second stage, a permanent SCS is placed if the temporary device reduced your pain  You will get a remote control to turn the pulse generator on and off and adjust the pulses  How do I prepare for surgery? Your surgeon will tell you how to prepare  He or she may tell you not to eat or drink anything after midnight on the day of surgery  Arrange to have someone drive you home when you are discharged from the hospital     Tell your surgeon about all medicines you currently take  Be sure to include any medicine that may cause you to bleed more, such as aspirin or blood thinners  Your surgeon will tell you if you need to stop any of your medicine for the surgery, and when to stop  He or she will tell you which medicines to take or not take on the day of surgery  Tell your surgeon if you have a blood disorder or had a bleeding problem  You may need blood or urine tests to check for infection and make sure your body is okay for surgery  You may also need an MRI to check your spine before your healthcare provider places the SCS  Ask your surgeon for more information about these and other tests you may need  What will happen during surgery? Temporary lead placement:      You may get general anesthesia to keep you asleep during surgery  You may get local anesthesia to numb the surgery area  Local anesthesia allows you to stay awake during the surgery so you can tell your surgeon when your pain decreases  This helps him or her make sure the SCS is in the best spot      Your surgeon will place electrical leads through a small incision or a needle inserted into your back  He or she may need to remove a small piece of bone to insert the leads along your spine  He or she will use an x-ray to make sure the leads are in the correct place  The incision will be covered with bandages  The leads will be connected to wires and attached to a pulse generator placed outside your body  Permanent lead placement:      Your surgeon will remove the temporary lead and replace it with a new, permanent lead through an incision or needle in your back  He or she will make another incision in your abdomen or buttocks  Then he or she will make a small pocket under your skin and place the SCS  Wires will be attached to the SCS  The wires will be tunneled under your skin  Your surgeon will connect the wires to the leads placed in your spine  The incisions will be closed with stitches and covered with a bandage  What should I expect after surgery? You will be taken to a room to rest until you are fully awake  Healthcare providers will monitor you closely for any problems  Do not get out of bed until your healthcare provider says it is okay  SCS settings  on the remote control can be changed to help relieve your pain  Your healthcare provider will show you how to adjust the settings  Medicines  may be given for surgery pain or to prevent a bacterial infection  Ice packs  may be used to decrease the pain from the incisions  What are the risks of spinal cord stimulator placement? The spinal cord stimulator may not work correctly and you may need to have it replaced  You may develop a headache, or your pain may get worse  Surgery may cause you to bleed or to leak spinal fluid  After surgery, you may get an infection at the incision site  You may also get a serious infection near where the leads are placed  This may cause paralysis or become life-threatening    CARE AGREEMENT:   You have the right to help plan your care  Learn about your health condition and how it may be treated  Discuss treatment options with your healthcare providers to decide what care you want to receive  You always have the right to refuse treatment  The above information is an  only  It is not intended as medical advice for individual conditions or treatments  Talk to your doctor, nurse or pharmacist before following any medical regimen to see if it is safe and effective for you  © Copyright Zulema Wilder 2022 Information is for End User's use only and may not be sold, redistributed or otherwise used for commercial purposes  Core Strengthening Exercises   WHAT YOU NEED TO KNOW:   Your core includes the muscles of your lower back, hip, pelvis, and abdomen  Core strengthening exercises help heal and strengthen these muscles  This helps prevent another injury, and keeps your pelvis, spine, and hips in the correct position  DISCHARGE INSTRUCTIONS:   Call your doctor or physical therapist if:   You have sharp or worsening pain during exercise or at rest     You have questions or concerns about your shoulder exercises  Safety tips:  Talk to your healthcare provider before you start an exercise program  A physical therapist can teach you how to do core strengthening exercises safely  Do the exercises on a mat or firm surface  A firm surface will support your spine and prevent low back pain  Do not do these exercises on a bed  Move slowly and smoothly  Avoid fast or jerky motions  Stop if you feel pain  You may feel some discomfort at first, but you should not feel pain  Tell your provider or physical therapist if you have pain while you exercise  Regular exercise will help decrease your discomfort over time  Breathe normally during core exercises  Do not hold your breath  This may cause an increase in blood pressure and prevent muscle strengthening   Your healthcare provider will tell you when to inhale and exhale during the exercise  Begin all of your exercises with abdominal bracing  Abdominal bracing helps warm up your core muscles  You can also practice abdominal bracing throughout the day  Lie on your back with your knees bent and feet flat on the floor  Place your arms in a relaxed position beside your body  Tighten your abdominal muscles  Pull your belly button in and up toward your spine  Hold for 5 seconds  Relax your muscles  Repeat 10 times  Core strengthening exercises: Your healthcare provider will tell you how often to do these exercises  The provider will also tell you how many repetitions of each exercise you should do  Hold each exercise for 5 seconds or as directed  As you get stronger, increase your hold to 10 to 15 seconds  You can do some of these exercises on a stability ball, or with a weight  Ask your healthcare provider how to use a stability ball or weight for these exercises:  Bridging:  Lie on your back with your knees bent and feet flat on the floor  Rest your arms at your side  Tighten your buttocks, and then lift your hips 1 inch off the floor  Hold for 5 seconds  When you can do this exercise without pain for 10 seconds, increase the distance you lift your hips  A good goal is to be able to lift your hips so that your shoulders, hips, and knees are in a straight line  Dead bug:  Lie on your back with your knees bent and feet flat on the floor  Place your arms in a relaxed position beside your body  Begin with abdominal bracing  Next, raise one leg, keeping your knee bent  Hold for 5 seconds  Repeat with the other leg  When you can do this exercise without pain for 10 to 15 seconds, you may raise one straight leg and hold  Repeat with the other leg  Quadruped:  Place your hands and knees on the floor  Keep your wrists directly below your shoulders and your knees directly below your hips  Pull your belly button in toward your spine   Do not flatten or arch your back  Tighten your abdominal muscles below your belly button  Hold for 5 seconds  When you can do this exercise without pain for 10 to 15 seconds, you may extend one arm and hold  Repeat on the other side  Side bridge exercises:      Standing side bridge:  Stand next to a wall and extend one arm toward the wall  Place your palm flat on the wall with your fingers pointing upward  Begin with abdominal bracing  Next, without moving your feet, slowly bend your arm to 90 degrees  Hold for 5 seconds  Repeat on the other side  When you can do this exercise without pain for 10 to 15 seconds, you may do the bent leg side bridge on the floor  Bent leg side bridge:  Lie on one side with your legs, hips, and shoulders in a straight line  Prop yourself up onto your forearm so your elbow is directly below your shoulder  Bend your knees back to 90 degrees  Begin with abdominal bracing  Next, lift your hips and balance yourself on your forearm and knees  Hold for 5 seconds  Repeat on the other side  When you can do this exercise without pain for 10 to 15 seconds, you may do the straight leg side bridge on the floor  Straight leg side bridge:  Lie on one side with your legs, hips, and shoulders in a straight line  Prop yourself up onto your forearm so your elbow is directly below your shoulder  Begin with abdominal bracing  Lift your hips off the floor and balance yourself on your forearm and the outside of your flexed foot  Do not let your ankle bend sideways  Hold for 5 seconds  Repeat on the other side  When you can do this exercise without pain for 10 to 15 seconds, ask your healthcare provider for more advanced exercises  Superman:  Lie on your stomach  Extend your arms forward on the floor  Tighten your abdominal muscles and lift your right hand and left leg off the floor  Hold this position  Slowly return to the starting position   Tighten your abdominal muscles and lift your left hand and right leg off the floor  Hold this position  Slowly return to the starting position  Clam:  Lie on your side with your knees bent  Put your bottom arm under your head to keep your neck in line  Put your top hand on your hip to keep your pelvis from moving  Put your heels together, and keep them together during this exercise  Slowly raise your top knee toward the ceiling  Then lower your leg so your knees are together  Repeat this exercise 10 times  Then switch sides and do the exercise 10 times with the other leg  Curl up:  Lie on your back with your knees bent and feet flat on the floor  Place your hands, palms down, underneath your lower back  Next, with your elbows on the floor, lift your shoulders and chest 2 to 3 inches off the floor  Keep your head in line with your shoulders  Hold this position  Slowly return to the starting position  Straight leg raises:  Lie on your back with one leg straight  Bend the other knee and place your foot flat on the floor  Tighten your abdominal muscles  Keep your leg straight and slowly lift it straight up 6 to 12 inches off the floor  Hold this position  Lower your leg slowly  Do as many repetitions as directed on this side  Repeat with the other leg  Plank:  Lie on your stomach  Bend your elbows and place your forearms flat on the floor  Lift your chest, stomach, and knees off the floor  Make sure your elbows are below your shoulders  Your body should be in a straight line  Do not let your hips or lower back sink to the ground  Squeeze your abdominal muscles together and hold for 15 seconds  To make this exercise harder, hold for 30 seconds or lift 1 leg at a time  Bicycles:  Lie on your back  Bend both knees and bring them toward your chest  Your calves should be parallel to the floor  Place the palms of your hands on the back of your head  Straighten your right leg and keep it lifted 2 inches off the floor   Raise your head and shoulders off the floor and twist towards your left  Keep your head and shoulders lifted  Bend your right knee while you straighten your left leg  Keep your left leg 2 inches off the floor  Twist your head and chest towards the left leg  Continue to straighten 1 leg at a time and twist        Follow up with your doctor or physical therapist as directed:  Write down your questions so you remember to ask them during your visits  © Copyright Frank Hernandez 2022 Information is for End User's use only and may not be sold, redistributed or otherwise used for commercial purposes  The above information is an  only  It is not intended as medical advice for individual conditions or treatments  Talk to your doctor, nurse or pharmacist before following any medical regimen to see if it is safe and effective for you

## 2023-06-30 ENCOUNTER — OFFICE VISIT (OUTPATIENT)
Dept: OBGYN CLINIC | Facility: CLINIC | Age: 58
End: 2023-06-30
Payer: COMMERCIAL

## 2023-06-30 VITALS
TEMPERATURE: 97.7 F | BODY MASS INDEX: 33.99 KG/M2 | HEIGHT: 65 IN | HEART RATE: 78 BPM | DIASTOLIC BLOOD PRESSURE: 80 MMHG | WEIGHT: 204 LBS | SYSTOLIC BLOOD PRESSURE: 120 MMHG

## 2023-06-30 DIAGNOSIS — M46.1 SACROILIITIS (HCC): Primary | ICD-10-CM

## 2023-06-30 DIAGNOSIS — M47.816 LUMBAR SPONDYLOSIS: ICD-10-CM

## 2023-06-30 PROCEDURE — 99204 OFFICE O/P NEW MOD 45 MIN: CPT | Performed by: ORTHOPAEDIC SURGERY

## 2023-06-30 NOTE — PROGRESS NOTES
ASSESSMENT/PLAN:    Diagnoses and all orders for this visit:    Sacroiliitis ([facility] [address] )    Lumbar spondylosis        Plan: I would recommend continuing treatment with Dr Maria Luisa Casiano, chiropractor or she could choose to see a spine surgeon to discuss whether a surgical intervention for her sacroiliac pain would be appropriate at this time  She has been informed that I do not see any evidence for this to be hip pathology  I would be happy to see her in follow-up if she has a change in symptoms or if it is felt that her symptoms become more consistent with hip pathology  Return if symptoms worsen or fail to improve       _____________________________________________________  CHIEF COMPLAINT:  Chief Complaint   Patient presents with   • Right Hip - Pain   • Left Hip - Pain         SUBJECTIVE:  Moreno Decker is a 61y o  year old female who presents for evaluation of bilateral lower back pain  She localizes pain to the area of the bilateral SI joint, left greater than right  She has had symptoms now for 1 year and has had physical therapy as well as SI joint injection and epidural steroid injection  She noted improvement for brief periods of time after these injections  She states that she was recently at a chiropractor's office and he had suggested that she see orthopedics for hip evaluation to determine if her hips were the source  She mentions no groin pain  She denies pain radiating distally into the lower extremities although she occasionally experiences some buttock pain  She denies lower extremity weakness or tiredness with prolonged ambulation  She does admit that pain increases with prolonged ambulation but remains focused in the lower back/SI joint region      PAST MEDICAL HISTORY:  Past Medical History:   Diagnosis Date   • Back pain    • DVT (deep vein thrombosis) in pregnancy    • Factor 5 Leiden mutation, heterozygous ([facility] [address] )    • High cholesterol    • Pulmonary embolism (H[facility])        PAST SURGICAL HISTORY:  Past Surgical History:   Procedure Laterality Date   • BACK SURGERY     • EPIDURAL BLOCK INJECTION N/A 4/18/2023    Procedure: BLOCK / INJECTION EPIDURAL STEROID LUMBAR L5-S1;  Surgeon: Anup Locke MD;  Location: [facility];  Service: Pain Management    • IR DVT THROMBOLYSIS/THROMBECTOMY LOWER EXTREMITY WITH VENOGRAM     • IA INJECT SI JOINT ARTHRGRPHY&/ANES/STEROID W/RYDER Left 03/16/2023    Procedure: BLOCK / INJECTION SACROILIAC;  Surgeon: Anup Locke MD;  Location: [facility];  Service: Pain Management        FAMILY HISTORY:  History reviewed  No pertinent family history  SOCIAL HISTORY:  Social History     Tobacco Use   • Smoking status: Never   • Smokeless tobacco: Never   Vaping Use   • Vaping Use: Never used   Substance Use Topics   • Alcohol use: Yes     Comment: social   • Drug use: Never       MEDICATIONS:    Current Outpatient Medications:   •  atorvastatin (LIPITOR) 20 mg tablet, Take 20 mg by mouth, Disp: , Rfl:   •  gabapentin (NEURONTIN) 300 mg capsule, Take 1 capsule (300 mg total) by mouth 3 (three) times a day, Disp: 90 capsule, Rfl: 0  •  multivitamin (THERAGRAN) TABS, Take 1 tablet by mouth daily, Disp: , Rfl:   •  rivaroxaban (XARELTO) 10 mg tablet, Take 1 tablet by mouth daily, Disp: , Rfl:     ALLERGIES:  Allergies   Allergen Reactions   • Ampicillin Rash   • Sulfa Antibiotics Rash       Review of systems:   Constitutional: Negative for fatigue, fever or loss of apetite  HENT: Negative  Respiratory: Negative for shortness of breath, dyspnea  Cardiovascular: Negative for chest pain/tightness  Gastrointestinal: Negative for abdominal pain, N/V  Endocrine: Negative for cold/heat intolerance, unexplained weight loss/gain  Genitourinary: Negative for flank pain, dysuria, hematuria  Musculoskeletal: Positive as in the HPI  Skin: Negative for rash      Neurological: Negative  Psychiatric/Behavioral: Negative for "agitation  _____________________________________________________  PHYSICAL EXAMINATION:    Blood pressure 120/80, pulse 78, temperature 97 7 °F (36 5 °C), temperature source Temporal, height 5' 5\" (1 651 m), weight 92 5 kg (204 lb)  General: well developed and well nourished, alert, oriented times 3 and appears comfortable  Psychiatric: Normal  HEENT: Benign  Cardiovascular: Regular    Pulmonary: No wheezing or stridor  Abdomen: Soft, Nontender  Skin: No masses, erythema, lacerations, fluctation, ulcerations  Neurovascular: Motor and sensory exams are grossly intact and pulses are palpable  MUSCULOSKELETAL EXAMINATION:    The bilateral hip exam demonstrates full range of motion in flexion, internal rotation, external rotation and abduction without complaints  SIRIA test elicits complaints of SI joint localized pain greater on the left than the right side  The lumbar spine exam demonstrates excellent flexion, extension, rotation and sidebending with complaints of lower back/SI joint region pain during extension and with left-sided symptoms triggered by rotation and left sidebending  The SI joints were slightly tender to palpation  She did also have some midline lumbar spine tenderness  Paraspinal muscles were nontender       _____________________________________________________  STUDIES REVIEWED:  X-rays of the lumbar spine including flexion and extension views obtained in March 2023 demonstrate status post fusion of the L4-5 level and some degenerative changes but no instability  MRI of the lumbar spine demonstrates disc herniation at the L2-3 level, disc bulging at L1-2 and L3-4 with some foraminal stenosis and compression upon the thecal sac  Status post fusion  Reports were reviewed          Asa Corey  "

## 2023-08-30 ENCOUNTER — OFFICE VISIT (OUTPATIENT)
Dept: PAIN MEDICINE | Facility: CLINIC | Age: 58
End: 2023-08-30
Payer: COMMERCIAL

## 2023-08-30 VITALS
HEART RATE: 84 BPM | HEIGHT: 65 IN | OXYGEN SATURATION: 96 % | SYSTOLIC BLOOD PRESSURE: 124 MMHG | BODY MASS INDEX: 34.62 KG/M2 | TEMPERATURE: 97.7 F | WEIGHT: 207.8 LBS | DIASTOLIC BLOOD PRESSURE: 84 MMHG

## 2023-08-30 DIAGNOSIS — M47.816 LUMBAR SPONDYLOSIS: ICD-10-CM

## 2023-08-30 DIAGNOSIS — M46.1 SACROILIITIS (HCC): ICD-10-CM

## 2023-08-30 DIAGNOSIS — M96.1 POSTLAMINECTOMY SYNDROME: ICD-10-CM

## 2023-08-30 DIAGNOSIS — M54.16 LUMBAR RADICULOPATHY: Primary | ICD-10-CM

## 2023-08-30 DIAGNOSIS — M81.0 OSTEOPOROSIS, UNSPECIFIED OSTEOPOROSIS TYPE, UNSPECIFIED PATHOLOGICAL FRACTURE PRESENCE: ICD-10-CM

## 2023-08-30 PROCEDURE — 99214 OFFICE O/P EST MOD 30 MIN: CPT | Performed by: ANESTHESIOLOGY

## 2023-08-30 RX ORDER — LIDOCAINE HYDROCHLORIDE 10 MG/ML
5 INJECTION, SOLUTION EPIDURAL; INFILTRATION; INTRACAUDAL; PERINEURAL ONCE
OUTPATIENT
Start: 2023-08-30 | End: 2023-08-30

## 2023-08-30 RX ORDER — GABAPENTIN 300 MG/1
300 CAPSULE ORAL 3 TIMES DAILY
Qty: 90 CAPSULE | Refills: 2 | Status: SHIPPED | OUTPATIENT
Start: 2023-08-30

## 2023-08-30 RX ORDER — 0.9 % SODIUM CHLORIDE 0.9 %
3 VIAL (ML) INJECTION ONCE
OUTPATIENT
Start: 2023-08-30 | End: 2023-08-30

## 2023-08-30 RX ORDER — METHYLPREDNISOLONE ACETATE 80 MG/ML
80 INJECTION, SUSPENSION INTRA-ARTICULAR; INTRALESIONAL; INTRAMUSCULAR; PARENTERAL; SOFT TISSUE ONCE
OUTPATIENT
Start: 2023-08-30 | End: 2023-08-30

## 2023-08-30 RX ORDER — METHOCARBAMOL 500 MG/1
TABLET, FILM COATED ORAL
COMMUNITY
Start: 2023-07-28

## 2023-08-30 NOTE — PATIENT INSTRUCTIONS
Core Strengthening Exercises   WHAT YOU NEED TO KNOW:   Your core includes the muscles of your lower back, hip, pelvis, and abdomen. Core strengthening exercises help heal and strengthen these muscles. This helps prevent another injury, and keeps your pelvis, spine, and hips in the correct position. DISCHARGE INSTRUCTIONS:   Call your doctor or physical therapist if:   You have sharp or worsening pain during exercise or at rest.    You have questions or concerns about your shoulder exercises. Safety tips:  Talk to your healthcare provider before you start an exercise program. A physical therapist can teach you how to do core strengthening exercises safely. Do the exercises on a mat or firm surface. A firm surface will support your spine and prevent low back pain. Do not do these exercises on a bed. Move slowly and smoothly. Avoid fast or jerky motions. Stop if you feel pain. You may feel some discomfort at first, but you should not feel pain. Tell your provider or physical therapist if you have pain while you exercise. Regular exercise will help decrease your discomfort over time. Breathe normally during core exercises. Do not hold your breath. This may cause an increase in blood pressure and prevent muscle strengthening. Your healthcare provider will tell you when to inhale and exhale during the exercise. Begin all of your exercises with abdominal bracing. Abdominal bracing helps warm up your core muscles. You can also practice abdominal bracing throughout the day. Lie on your back with your knees bent and feet flat on the floor. Place your arms in a relaxed position beside your body. Tighten your abdominal muscles. Pull your belly button in and up toward your spine. Hold for 5 seconds. Relax your muscles. Repeat 10 times. Core strengthening exercises: Your healthcare provider will tell you how often to do these exercises.  The provider will also tell you how many repetitions of each exercise you should do. Hold each exercise for 5 seconds or as directed. As you get stronger, increase your hold to 10 to 15 seconds. You can do some of these exercises on a stability ball, or with a weight. Ask your healthcare provider how to use a stability ball or weight for these exercises:  Bridging:  Lie on your back with your knees bent and feet flat on the floor. Rest your arms at your side. Tighten your buttocks, and then lift your hips 1 inch off the floor. Hold for 5 seconds. When you can do this exercise without pain for 10 seconds, increase the distance you lift your hips. A good goal is to be able to lift your hips so that your shoulders, hips, and knees are in a straight line. Dead bug:  Lie on your back with your knees bent and feet flat on the floor. Place your arms in a relaxed position beside your body. Begin with abdominal bracing. Next, raise one leg, keeping your knee bent. Hold for 5 seconds. Repeat with the other leg. When you can do this exercise without pain for 10 to 15 seconds, you may raise one straight leg and hold. Repeat with the other leg. Quadruped:  Place your hands and knees on the floor. Keep your wrists directly below your shoulders and your knees directly below your hips. Pull your belly button in toward your spine. Do not flatten or arch your back. Tighten your abdominal muscles below your belly button. Hold for 5 seconds. When you can do this exercise without pain for 10 to 15 seconds, you may extend one arm and hold. Repeat on the other side. Side bridge exercises:      Standing side bridge:  Stand next to a wall and extend one arm toward the wall. Place your palm flat on the wall with your fingers pointing upward. Begin with abdominal bracing. Next, without moving your feet, slowly bend your arm to 90 degrees. Hold for 5 seconds. Repeat on the other side.  When you can do this exercise without pain for 10 to 15 seconds, you may do the bent leg side bridge on the floor. Bent leg side bridge:  Lie on one side with your legs, hips, and shoulders in a straight line. Prop yourself up onto your forearm so your elbow is directly below your shoulder. Bend your knees back to 90 degrees. Begin with abdominal bracing. Next, lift your hips and balance yourself on your forearm and knees. Hold for 5 seconds. Repeat on the other side. When you can do this exercise without pain for 10 to 15 seconds, you may do the straight leg side bridge on the floor. Straight leg side bridge:  Lie on one side with your legs, hips, and shoulders in a straight line. Prop yourself up onto your forearm so your elbow is directly below your shoulder. Begin with abdominal bracing. Lift your hips off the floor and balance yourself on your forearm and the outside of your flexed foot. Do not let your ankle bend sideways. Hold for 5 seconds. Repeat on the other side. When you can do this exercise without pain for 10 to 15 seconds, ask your healthcare provider for more advanced exercises. Superman:  Lie on your stomach. Extend your arms forward on the floor. Tighten your abdominal muscles and lift your right hand and left leg off the floor. Hold this position. Slowly return to the starting position. Tighten your abdominal muscles and lift your left hand and right leg off the floor. Hold this position. Slowly return to the starting position. Clam:  Lie on your side with your knees bent. Put your bottom arm under your head to keep your neck in line. Put your top hand on your hip to keep your pelvis from moving. Put your heels together, and keep them together during this exercise. Slowly raise your top knee toward the ceiling. Then lower your leg so your knees are together. Repeat this exercise 10 times. Then switch sides and do the exercise 10 times with the other leg. Curl up:  Lie on your back with your knees bent and feet flat on the floor.  Place your hands, palms down, underneath your lower back. Next, with your elbows on the floor, lift your shoulders and chest 2 to 3 inches off the floor. Keep your head in line with your shoulders. Hold this position. Slowly return to the starting position. Straight leg raises:  Lie on your back with one leg straight. Bend the other knee and place your foot flat on the floor. Tighten your abdominal muscles. Keep your leg straight and slowly lift it straight up 6 to 12 inches off the floor. Hold this position. Lower your leg slowly. Do as many repetitions as directed on this side. Repeat with the other leg. Plank:  Lie on your stomach. Bend your elbows and place your forearms flat on the floor. Lift your chest, stomach, and knees off the floor. Make sure your elbows are below your shoulders. Your body should be in a straight line. Do not let your hips or lower back sink to the ground. Squeeze your abdominal muscles together and hold for 15 seconds. To make this exercise harder, hold for 30 seconds or lift 1 leg at a time. Bicycles:  Lie on your back. Bend both knees and bring them toward your chest. Your calves should be parallel to the floor. Place the palms of your hands on the back of your head. Straighten your right leg and keep it lifted 2 inches off the floor. Raise your head and shoulders off the floor and twist towards your left. Keep your head and shoulders lifted. Bend your right knee while you straighten your left leg. Keep your left leg 2 inches off the floor. Twist your head and chest towards the left leg. Continue to straighten 1 leg at a time and twist.       Follow up with your doctor or physical therapist as directed:  Write down your questions so you remember to ask them during your visits. © Copyright Lynda Ranks 2022 Information is for End User's use only and may not be sold, redistributed or otherwise used for commercial purposes. The above information is an  only.  It is not intended as medical advice for individual conditions or treatments. Talk to your doctor, nurse or pharmacist before following any medical regimen to see if it is safe and effective for you.

## 2023-08-30 NOTE — H&P (VIEW-ONLY)
Assessment  1. Lumbar radiculopathy    2. Lumbar spondylosis    3. Postlaminectomy syndrome    4. Sacroiliitis (HCC)      Greater than 80% relief of pain with improved ability to participate with IADLs after ILESI at L5-S1. Now describes symptoms more consistent with spinal stenosis/neurogenic claudication from severe stenosis noted at L3-L4 on 3/23 MRI. Describes shopping cart sign, back pain and stiffness with prolonged activity. Previously reported the following symptomatology:     Axial left low back pain described primarily by arthritic features. Aching, nagging, indolent, stabbing, throbbing features in axial left low back with bilateral L5 and S1 radicular (left greater than right) components. 5/5 strength bilaterally, negative SLR. Positive facet loading maneuvers in lumbar spine elicited pain, positive tenderness to palpation over lumbar paraspinal muscles. Positive iram's, gaenslen's, SIJ loading left sided as well as ritesh finger and compression test.  Prior L4-L5 fusion in 2016. On MRI at L3-L4 moderate central canal stenosis with mild bilateral neuroforaminal stenosis, at L4-L5 grade 1 spondylolisthesis of L4 on L5 with postsurgical changes, posterior central disc herniation. At L5-S1 a left central disc herniation leading to mild left sided neural foraminal stenosis, and small synovial cyst abutting left S1 spinal nerve. Currently she is neurologically intact without gait instability, saddle anesthesia or bowel/bladder abnormality. Risks, benefits alternative to ILESI thoroughly discussed with patient. Handouts provided questions answered to patient satisfaction. Will additionally obtain imaging to rule out hardware instability or new disc degeneration at L5-S1.     Plan  -L3-L4 ILESI; f/u 2 weeks post procedure  -DXA spine hip and pelvis; f/u result with patient  -gabapentin 300mg TID ordered for patient; counseled regarding sedative effects of taking this medication and provided up titration calendar. Counseled not to take medication while driving or operating heavy machinery/using stairs  -Physician directed home exercise plan as per [facility] demonstrated and handouts provided that patient plans to participate with for 1 hour, twice a week for the next 6 weeks. There are risks associated with opioid medications, including dependence, addiction and tolerance. The patient understands and agrees to use these medications only as prescribed. Potential side effects of the medications include, but are not limited to, constipation, drowsiness, addiction, impaired judgment and risk of fatal overdose if not taken as prescribed. The patient was warned against driving while taking sedation medications or operating heavy machinery. The patient voiced understanding. Sharing medications is a felony. At this point in time, the patient is showing no signs of addiction, abuse, diversion or suicidal ideation. [place] Prescription Drug Monitoring Program report was reviewed and was appropriate      Complete risks and benefits including bleeding, infection, tissue reaction, nerve injury and allergic reaction were discussed. The approach was demonstrated using models and literature was provided. Verbal and written consent was obtained. My impressions and treatment recommendations were discussed in detail with the patient who verbalized understanding and had no further questions. Discharge instructions were provided. I personally saw and examined the patient and I agree with the above discussed plan of care. New Medications Ordered This Visit   Medications   • methocarbamol (ROBAXIN) 500 mg tablet       History of Present Illness    Greater than 80% relief of pain with improved ability to participate with IADLs after ILESI at L5-S1. Now describes symptoms more consistent with spinal stenosis/neurogenic claudication from severe stenosis noted at L3-L4 on 3/23 MRI.  Describes shopping cart sign, back pain and stiffness with prolonged activity. Previously reported the following symptomatology:     Rachelle Cortez is a 61 y.o. female with pmhx of factor V leiden deficiency on xarelto (DVT, PE in 2014, prior L4-L5 fusion in presenting with chronic left greater than right low back pain described primarily as arthritic in nature. She describes 8/10 low back pain that is worse in the mornings and worse at the end of the day. The pain is characterized by achy, nagging, indolent, crampy, stabbing pain in her left sided axial low back. The patient describes that the pain is worse with standing for long periods of time on hard surfaces as well as with walking. The patient is a very active individual and feels as though this pain compromises her participation with independent activities of daily living. The pain can be debilitating at times and contribute to significant disability, compromising overall activity and independent activities of daily living. She has tried physical therapy with limited relief of symptoms. Medications the patient has tried in the past include nsaids, tylenol. She describes no radicular symptoms and has good strength. She endoreses pain limited weakness in bilateral L5 and S1 dermatomes accompanied by numbness and paresthesias. The patient denies any bowel or bladder dysfunction as well, saddle anesthesia or gait instability. I have personally reviewed and/or updated the patient's past medical history, past surgical history, family history, social history, current medications, allergies, and vital signs today. Review of Systems   Constitutional: Positive for activity change. HENT: Negative. Eyes: Negative. Respiratory: Negative. Cardiovascular: Negative. Gastrointestinal: Negative. Endocrine: Negative. Genitourinary: Negative. Musculoskeletal: Positive for arthralgias, back pain and myalgias. Negative for gait problem. Skin: Negative. Allergic/Immunologic: Negative. Neurological: Negative for weakness and numbness. Hematological: Negative. Psychiatric/Behavioral: Negative. All other systems reviewed and are negative. Patient Active Problem List   Diagnosis   • Postlaminectomy syndrome   • Lumbar spondylosis   • Sacroiliitis (HCC)   • Lumbar radiculopathy       Past Medical History:   Diagnosis Date   • Back pain    • DVT (deep vein thrombosis) in pregnancy    • Factor 5 Leiden mutation, heterozygous ([address])    • High cholesterol    • Pulmonary embolism (HCC)        Past Surgical History:   Procedure Laterality Date   • BACK SURGERY     • EPIDURAL BLOCK INJECTION N/A 4/18/2023    Procedure: BLOCK / INJECTION EPIDURAL STEROID LUMBAR L5-S1;  Surgeon: Lor Wilkes MD;  Location: [facility];  Service: Pain Management    • IR DVT THROMBOLYSIS/THROMBECTOMY LOWER EXTREMITY WITH VENOGRAM     • HI INJECT SI JOINT ARTHRGRPHY&/ANES/STEROID W/RYDER Left 03/16/2023    Procedure: BLOCK / INJECTION SACROILIAC;  Surgeon: Lor Wilkes MD;  Location: [facility];  Service: Pain Management        History reviewed. No pertinent family history.     Social History     Occupational History   • Not on file   Tobacco Use   • Smoking status: Never   • Smokeless tobacco: Never   Vaping Use   • Vaping Use: Never used   Substance and Sexual Activity   • Alcohol use: Yes     Comment: social   • Drug use: Never   • Sexual activity: Not on file       Current Outpatient Medications on File Prior to Visit   Medication Sig   • atorvastatin (LIPITOR) 20 mg tablet Take 20 mg by mouth   • multivitamin (THERAGRAN) TABS Take 1 tablet by mouth daily   • rivaroxaban (XARELTO) 10 mg tablet Take 1 tablet by mouth daily   • gabapentin (NEURONTIN) 300 mg capsule Take 1 capsule (300 mg total) by mouth 3 (three) times a day (Patient not taking: Reported on 8/30/2023)   • methocarbamol (ROBAXIN) 500 mg tablet  (Patient not taking: Reported on 8/30/2023)     No current facility-administered medications on file prior to visit. Allergies   Allergen Reactions   • Ampicillin Rash   • Sulfa Antibiotics Rash       Physical Exam    /84 (BP Location: Left arm, Patient Position: Sitting, Cuff Size: Adult)   Pulse 84   Temp 97.7 °F (36.5 °C)   Ht 5' 5" (1.651 m)   Wt 94.3 kg (207 lb 12.8 oz)   SpO2 96%   BMI 34.58 kg/m²     Constitutional: normal, well developed, well nourished, alert, in no distress and non-toxic and no overt pain behavior. and obese  Eyes: anicteric  HEENT: grossly intact  Neck: supple, symmetric, trachea midline and no masses   Pulmonary:even and unlabored  Cardiovascular:No edema or pitting edema present  Skin:Normal without rashes or lesions and well hydrated  Psychiatric:Mood and affect appropriate  Neurologic:Cranial Nerves II-XII grossly intact Sensation grossly intact; no clonus negative piper's. Reflexes 2+ and brisk. SLR negative bilaterally. Musculoskeletal:normal gait. 5/5 strength bilaterally with AROM in lower extremities. Normal heel toe and tip toe walking. Significant pain with lumbar facet loading bilaterally and with lateral spine rotation. No ttp over lumbar paraspinal muscles. Positive iram's test,  gaenslen's SIJ loading left sided bilaterally. Imaging     On MRI at L3-L4 moderate central canal stenosis with mild bilateral neuroforaminal stenosis, at L4-L5 grade 1 spondylolisthesis of L4 on L5 with postsurgical changes, posterior central disc herniation. At L5-S1 a left central disc herniation leading to mild left sided neural foraminal stenosis, and small synovial cyst abutting left S1 spinal nerve.

## 2023-08-30 NOTE — PROGRESS NOTES
Assessment  1. Lumbar radiculopathy    2. Lumbar spondylosis    3. Postlaminectomy syndrome    4. Sacroiliitis (HCC)      Greater than 80% relief of pain with improved ability to participate with IADLs after ILESI at L5-S1. Now describes symptoms more consistent with spinal stenosis/neurogenic claudication from severe stenosis noted at L3-L4 on 3/23 MRI. Describes shopping cart sign, back pain and stiffness with prolonged activity. Previously reported the following symptomatology:     Axial left low back pain described primarily by arthritic features. Aching, nagging, indolent, stabbing, throbbing features in axial left low back with bilateral L5 and S1 radicular (left greater than right) components. 5/5 strength bilaterally, negative SLR. Positive facet loading maneuvers in lumbar spine elicited pain, positive tenderness to palpation over lumbar paraspinal muscles. Positive iram's, gaenslen's, SIJ loading left sided as well as ritesh finger and compression test.  Prior L4-L5 fusion in 2016. On MRI at L3-L4 moderate central canal stenosis with mild bilateral neuroforaminal stenosis, at L4-L5 grade 1 spondylolisthesis of L4 on L5 with postsurgical changes, posterior central disc herniation. At L5-S1 a left central disc herniation leading to mild left sided neural foraminal stenosis, and small synovial cyst abutting left S1 spinal nerve. Currently she is neurologically intact without gait instability, saddle anesthesia or bowel/bladder abnormality. Risks, benefits alternative to ILESI thoroughly discussed with patient. Handouts provided questions answered to patient satisfaction. Will additionally obtain imaging to rule out hardware instability or new disc degeneration at L5-S1.     Plan  -L3-L4 ILESI; f/u 2 weeks post procedure  -DXA spine hip and pelvis; f/u result with patient  -gabapentin 300mg TID ordered for patient; counseled regarding sedative effects of taking this medication and provided up titration calendar. Counseled not to take medication while driving or operating heavy machinery/using stairs  -Physician directed home exercise plan as per [employer] demonstrated and handouts provided that patient plans to participate with for 1 hour, twice a week for the next 6 weeks. There are risks associated with opioid medications, including dependence, addiction and tolerance. The patient understands and agrees to use these medications only as prescribed. Potential side effects of the medications include, but are not limited to, constipation, drowsiness, addiction, impaired judgment and risk of fatal overdose if not taken as prescribed. The patient was warned against driving while taking sedation medications or operating heavy machinery. The patient voiced understanding. Sharing medications is a felony. At this point in time, the patient is showing no signs of addiction, abuse, diversion or suicidal ideation. [place] Prescription Drug Monitoring Program report was reviewed and was appropriate      Complete risks and benefits including bleeding, infection, tissue reaction, nerve injury and allergic reaction were discussed. The approach was demonstrated using models and literature was provided. Verbal and written consent was obtained. My impressions and treatment recommendations were discussed in detail with the patient who verbalized understanding and had no further questions. Discharge instructions were provided. I personally saw and examined the patient and I agree with the above discussed plan of care. New Medications Ordered This Visit   Medications   • methocarbamol (ROBAXIN) 500 mg tablet       History of Present Illness    Greater than 80% relief of pain with improved ability to participate with IADLs after ILESI at L5-S1. Now describes symptoms more consistent with spinal stenosis/neurogenic claudication from severe stenosis noted at L3-L4 on 3/23 MRI.  Describes shopping cart sign, back pain and stiffness with prolonged activity. Previously reported the following symptomatology:     Angela Martell is a 61 y.o. female with pmhx of factor V leiden deficiency on xarelto (DVT, PE in 2014, prior L4-L5 fusion in presenting with chronic left greater than right low back pain described primarily as arthritic in nature. She describes 8/10 low back pain that is worse in the mornings and worse at the end of the day. The pain is characterized by achy, nagging, indolent, crampy, stabbing pain in her left sided axial low back. The patient describes that the pain is worse with standing for long periods of time on hard surfaces as well as with walking. The patient is a very active individual and feels as though this pain compromises her participation with independent activities of daily living. The pain can be debilitating at times and contribute to significant disability, compromising overall activity and independent activities of daily living. She has tried physical therapy with limited relief of symptoms. Medications the patient has tried in the past include nsaids, tylenol. She describes no radicular symptoms and has good strength. She endoreses pain limited weakness in bilateral L5 and S1 dermatomes accompanied by numbness and paresthesias. The patient denies any bowel or bladder dysfunction as well, saddle anesthesia or gait instability. I have personally reviewed and/or updated the patient's past medical history, past surgical history, family history, social history, current medications, allergies, and vital signs today. Review of Systems   Constitutional: Positive for activity change. HENT: Negative. Eyes: Negative. Respiratory: Negative. Cardiovascular: Negative. Gastrointestinal: Negative. Endocrine: Negative. Genitourinary: Negative. Musculoskeletal: Positive for arthralgias, back pain and myalgias. Negative for gait problem. Skin: Negative. Allergic/Immunologic: Negative. Neurological: Negative for weakness and numbness. Hematological: Negative. Psychiatric/Behavioral: Negative. All other systems reviewed and are negative. Patient Active Problem List   Diagnosis   • Postlaminectomy syndrome   • Lumbar spondylosis   • Sacroiliitis (HCC)   • Lumbar radiculopathy       Past Medical History:   Diagnosis Date   • Back pain    • DVT (deep vein thrombosis) in pregnancy    • Factor 5 Leiden mutation, heterozygous ([address])    • High cholesterol    • Pulmonary embolism (HCC)        Past Surgical History:   Procedure Laterality Date   • BACK SURGERY     • EPIDURAL BLOCK INJECTION N/A 4/18/2023    Procedure: BLOCK / INJECTION EPIDURAL STEROID LUMBAR L5-S1;  Surgeon: Shannon Rees MD;  Location: [facility];  Service: Pain Management    • IR DVT THROMBOLYSIS/THROMBECTOMY LOWER EXTREMITY WITH VENOGRAM     • MI INJECT SI JOINT ARTHRGRPHY&/ANES/STEROID W/RYDER Left 03/16/2023    Procedure: BLOCK / INJECTION SACROILIAC;  Surgeon: Shannon Rees MD;  Location: [facility];  Service: Pain Management        History reviewed. No pertinent family history.     Social History     Occupational History   • Not on file   Tobacco Use   • Smoking status: Never   • Smokeless tobacco: Never   Vaping Use   • Vaping Use: Never used   Substance and Sexual Activity   • Alcohol use: Yes     Comment: social   • Drug use: Never   • Sexual activity: Not on file       Current Outpatient Medications on File Prior to Visit   Medication Sig   • atorvastatin (LIPITOR) 20 mg tablet Take 20 mg by mouth   • multivitamin (THERAGRAN) TABS Take 1 tablet by mouth daily   • rivaroxaban (XARELTO) 10 mg tablet Take 1 tablet by mouth daily   • gabapentin (NEURONTIN) 300 mg capsule Take 1 capsule (300 mg total) by mouth 3 (three) times a day (Patient not taking: Reported on 8/30/2023)   • methocarbamol (ROBAXIN) 500 mg tablet  (Patient not taking: Reported on 8/30/2023)     No current facility-administered medications on file prior to visit. Allergies   Allergen Reactions   • Ampicillin Rash   • Sulfa Antibiotics Rash       Physical Exam    /84 (BP Location: Left arm, Patient Position: Sitting, Cuff Size: Adult)   Pulse 84   Temp 97.7 °F (36.5 °C)   Ht 5' 5" (1.651 m)   Wt 94.3 kg (207 lb 12.8 oz)   SpO2 96%   BMI 34.58 kg/m²     Constitutional: normal, well developed, well nourished, alert, in no distress and non-toxic and no overt pain behavior. and obese  Eyes: anicteric  HEENT: grossly intact  Neck: supple, symmetric, trachea midline and no masses   Pulmonary:even and unlabored  Cardiovascular:No edema or pitting edema present  Skin:Normal without rashes or lesions and well hydrated  Psychiatric:Mood and affect appropriate  Neurologic:Cranial Nerves II-XII grossly intact Sensation grossly intact; no clonus negative piper's. Reflexes 2+ and brisk. SLR negative bilaterally. Musculoskeletal:normal gait. 5/5 strength bilaterally with AROM in lower extremities. Normal heel toe and tip toe walking. Significant pain with lumbar facet loading bilaterally and with lateral spine rotation. No ttp over lumbar paraspinal muscles. Positive iram's test,  gaenslen's SIJ loading left sided bilaterally. Imaging     On MRI at L3-L4 moderate central canal stenosis with mild bilateral neuroforaminal stenosis, at L4-L5 grade 1 spondylolisthesis of L4 on L5 with postsurgical changes, posterior central disc herniation. At L5-S1 a left central disc herniation leading to mild left sided neural foraminal stenosis, and small synovial cyst abutting left S1 spinal nerve.

## 2023-09-11 ENCOUNTER — TELEPHONE (OUTPATIENT)
Age: 58
End: 2023-09-11

## 2023-09-11 NOTE — TELEPHONE ENCOUNTER
Caller: Sosa Red     Doctor: Dr Priyank Ayers     Reason for call: Patient calling stating needs to reschedule due to getting antibiotics for infection. Patient states will call back to reschedule.     Call back#: [phone]

## 2023-09-26 NOTE — DISCHARGE INSTR - AVS FIRST PAGE
YOUR 2 WEEK FOLLOW UP HAS BEEN SCHEDULED; IF YOU WISH TO CHANGE THE FOLLOW UP, PLEASE CALL THE [facility] [place]: [phone]    Epidural Steroid Injection   WHAT YOU NEED TO KNOW:   An epidural steroid injection (AMANDA) is a procedure to inject steroid medicine into the epidural space. The epidural space is between your spinal cord and vertebrae. Steroids reduce inflammation and fluid buildup in your spine that may be causing pain. You may be given pain medicine along with the steroids. DISCHARGE INSTRUCTIONS:   Call your local emergency number (911 in the [address]) if:   You have a seizure. You have trouble moving your legs. Seek care immediately if:   Blood soaks through your bandage. You have a fever or chills, severe back pain, and the procedure area is sensitive to the touch. You cannot control when you urinate or have a bowel movement. Call your doctor if:   You have weakness or numbness in your legs. Your wound is red, swollen, or draining pus. You have nausea or are vomiting. Your face or neck is red and you feel warm. You have more pain than you had before the procedure. You have swelling in your hands or feet. You have questions or concerns about your condition or care. Care for your wound as directed: You may remove the bandage before you go to bed the day of your procedure. You may take a shower, but do not take a bath for at least 24 hours. Self-care:   Do not drive,  use machines, or do strenuous activity for 24 hours after your procedure or as directed. Continue other treatments  as directed. Steroid injections alone will not control your pain. The injections are meant to be used with other treatments, such as physical therapy. Follow up with your doctor as directed:  Write down your questions so you remember to ask them during your visits.    © Copyright Elisha [employer]oltz  Information is for End User's use only and may not be sold, redistributed or otherwise used for commercial purposes. The above information is an  only. It is not intended as medical advice for individual conditions or treatments. Talk to your doctor, nurse or pharmacist before following any medical regimen to see if it is safe and effective for you.

## 2023-09-28 ENCOUNTER — APPOINTMENT (OUTPATIENT)
Dept: RADIOLOGY | Facility: HOSPITAL | Age: 58
End: 2023-09-28
Payer: COMMERCIAL

## 2023-09-28 ENCOUNTER — HOSPITAL ENCOUNTER (OUTPATIENT)
Facility: HOSPITAL | Age: 58
Setting detail: OUTPATIENT SURGERY
Discharge: HOME | End: 2023-09-28
Attending: ANESTHESIOLOGY | Admitting: ANESTHESIOLOGY
Payer: COMMERCIAL

## 2023-09-28 VITALS
DIASTOLIC BLOOD PRESSURE: 81 MMHG | RESPIRATION RATE: 18 BRPM | OXYGEN SATURATION: 96 % | HEART RATE: 68 BPM | HEIGHT: 65 IN | SYSTOLIC BLOOD PRESSURE: 135 MMHG | BODY MASS INDEX: 34.49 KG/M2 | TEMPERATURE: 97.5 F | WEIGHT: 207 LBS

## 2023-09-28 PROCEDURE — 62323 NJX INTERLAMINAR LMBR/SAC: CPT | Performed by: ANESTHESIOLOGY

## 2023-09-28 RX ORDER — METHYLPREDNISOLONE ACETATE 80 MG/ML
INJECTION, SUSPENSION INTRA-ARTICULAR; INTRALESIONAL; INTRAMUSCULAR; SOFT TISSUE AS NEEDED
Status: DISCONTINUED | OUTPATIENT
Start: 2023-09-28 | End: 2023-09-28 | Stop reason: HOSPADM

## 2023-09-28 RX ORDER — SODIUM CHLORIDE 9 MG/ML
INJECTION INTRAVENOUS AS NEEDED
Status: DISCONTINUED | OUTPATIENT
Start: 2023-09-28 | End: 2023-09-28 | Stop reason: HOSPADM

## 2023-09-28 RX ORDER — LIDOCAINE HYDROCHLORIDE 10 MG/ML
INJECTION, SOLUTION EPIDURAL; INFILTRATION; INTRACAUDAL; PERINEURAL AS NEEDED
Status: DISCONTINUED | OUTPATIENT
Start: 2023-09-28 | End: 2023-09-28 | Stop reason: HOSPADM

## 2023-09-28 NOTE — INTERVAL H&P NOTE
H&P reviewed. After examining the patient I find no changes in the patients condition since the H&P had been written.     Vitals:    09/28/23 0708   BP: 151/86   Pulse: 70   Resp: 18   Temp: (!) 97.4 °F (36.3 °C)   SpO2: 95%

## 2023-09-28 NOTE — OP NOTE
OPERATIVE REPORT  PATIENT NAME: Jose Manuel Blum    :  1965  MRN: [id number]  Pt Location:  GI ROOM 01    SURGERY DATE: 2023    Surgeon(s) and Role: Chester Patel MD - Primary    Preop Diagnosis:  Lumbar radiculopathy [M54.16]    Post-Op Diagnosis Codes:     * Lumbar radiculopathy [M54.16]    Procedure(s):  BLOCK / INJECTION EPIDURAL STEROID LUMBAR L3-L4    Specimen(s):  * No specimens in log *    Estimated Blood Loss:   Minimal    Drains:  * No LDAs found *    Anesthesia Type:   Local    Operative Indications:  Lumbar radiculopathy [M54.16]    Operative Findings:  L3-L4 epidurogram    Complications:   None    Procedure and Technique:  Please see detailed procedure note    I was present for the entire procedure.     Patient Disposition:  PACU     SIGNATURE: Chester Patel MD  DATE: 2023  TIME: 7:38 AM

## 2023-09-28 NOTE — PROCEDURES
Pre-procedure Diagnosis:       Lumbar Radiculopathy  Post-procedure Diagnosis:     Lumbar Radiculopathy  Procedure Title(s):                  1. [L3-L4] interlaminar epidural steroid injection                                                  2. Intraoperative fluoroscopy  Attending Surgeon:                 Mohinder Zaragoza MD  Anesthesia:                             Local      Indications: The patient is a  60y.o. year-old female  with a diagnosis of Lumbar Radiculopathy. The patient's history and physical exam were reviewed. The risks, benefits and alternatives to the procedure were discussed, and all questions were answered to the patient's satisfaction. The patient agreed to proceed, and written informed consent was obtained. Procedure in Detail: The patient was brought into the procedure room and placed in the prone position on the fluoroscopy table. The area of the lumbar spine was prepped with chlorhexidine gluconate solution times one and draped in a sterile manner. The [L3-L4] interspace was identified and marked under AP fluoroscopy. The skin and subcutaneous tissues in the area were anesthetized with 1% lidocaine. A 18-gauge Tuohy epidural needle was directed toward the interspace under fluoroscopic guidance until the ligamentum flavum was engaged. From this point, a loss of resistance technique with saline was used to identify entrance of the needle into the epidural space. Once an appropriate loss was obtained, negative aspiration was confirmed, and 1 ml Omnipaque 240 contrast solution was injected. An appropriate epidurogram was noted. Then, after negative aspiration, a solution consisting of 1-mL depo-medrol (80mg/mL) and 3-mL preservative-free saline was easily injected. The needle was removed with a 1% lidocaine flush. The patient's back was cleaned and a bandage was placed over the site of needle insertion.      Disposition: The patient tolerated the procedure well, and there were no apparent complications. The patient was taken to the recovery area where written discharge instructions for the procedure were given.       Estimated Blood Loss: None  Specimens Obtained: N/A

## 2023-10-05 ENCOUNTER — TELEPHONE (OUTPATIENT)
Dept: PAIN MEDICINE | Facility: CLINIC | Age: 58
End: 2023-10-05

## 2023-10-12 RX ORDER — CEPHALEXIN 500 MG/1
500 CAPSULE ORAL 4 TIMES DAILY
COMMUNITY
Start: 2023-09-11 | End: 2023-10-13

## 2023-10-13 ENCOUNTER — OFFICE VISIT (OUTPATIENT)
Dept: PAIN MEDICINE | Facility: CLINIC | Age: 58
End: 2023-10-13
Payer: COMMERCIAL

## 2023-10-13 VITALS
HEIGHT: 65 IN | SYSTOLIC BLOOD PRESSURE: 130 MMHG | BODY MASS INDEX: 32.65 KG/M2 | OXYGEN SATURATION: 96 % | TEMPERATURE: 98 F | HEART RATE: 80 BPM | DIASTOLIC BLOOD PRESSURE: 88 MMHG | WEIGHT: 196 LBS

## 2023-10-13 DIAGNOSIS — M96.1 POSTLAMINECTOMY SYNDROME: Primary | ICD-10-CM

## 2023-10-13 DIAGNOSIS — M47.816 LUMBAR SPONDYLOSIS: ICD-10-CM

## 2023-10-13 DIAGNOSIS — M54.16 LUMBAR RADICULOPATHY: ICD-10-CM

## 2023-10-13 PROCEDURE — 99213 OFFICE O/P EST LOW 20 MIN: CPT | Performed by: ANESTHESIOLOGY

## 2023-10-13 NOTE — PATIENT INSTRUCTIONS
Pregabalin (By mouth)   Pregabalin (pre-GA-ba-lula)  Treats nerve and muscle pain, including fibromyalgia. Also treats partial-onset seizures. Brand Name(s): Lyrica, Lyrica CR   There may be other brand names for this medicine. When This Medicine Should Not Be Used: This medicine is not right for everyone. Do not use it if you had an allergic reaction to pregabalin. How to Use This Medicine:   Capsule, Liquid, Long Acting Tablet  Take your medicine as directed. Your dose may need to be changed several times to find what works best for you. Extended-release tablet: Swallow the extended-release tablet whole. Do not crush, break, or chew it. Take it after an evening meal.  Oral liquid: Measure the oral liquid medicine with a marked measuring spoon, oral syringe, or medicine cup. This medicine should come with a Medication Guide. Ask your pharmacist for a copy if you do not have one. Missed dose: Take a dose as soon as you remember. If it is almost time for your next dose, wait until then and take a regular dose. Do not take extra medicine to make up for a missed dose. If you miss a dose of the extended-release tablet after your evening meal, take it before bedtime after a snack. If you miss the dose before bedtime, take it after your morning meal. If you do not take the dose the following morning, then take the next dose at your regular time after your evening meal. Do not take 2 doses at the same time. Store the medicine in a closed container at room temperature, away from heat, moisture, and direct light. Drugs and Foods to Avoid:   Ask your doctor or pharmacist before using any other medicine, including over-the-counter medicines, vitamins, and herbal products. Some medicines can affect how pregabalin works.  Tell your doctor if you are using any of the following:   ACE inhibitor (including benazepril, enalapril, lisinopril, quinapril, ramipril)  Oral diabetes medicine (including metformin, pioglitazone, rosiglitazone)  Do not drink alcohol while you are using this medicine. Tell your doctor if you use anything else that makes you sleepy. Some examples are allergy medicine, narcotic pain medicine, and alcohol. Tell your doctor if you are also using oxycodone, lorazepam, or zolpidem. Warnings While Using This Medicine:   Tell your doctor if you are pregnant or breastfeeding, or if you have kidney disease, heart failure, heart rhythm problems, lung or breathing problems, a bleeding disorder, diabetes, sores or skin problems, or a low blood platelet count. Tell your doctor if you have a history of angioedema (severe swelling), alcohol or drug abuse, depression, or other mood problems. This medicine may cause the following problems:   Angioedema (severe swelling), which may be life-threatening  Changes in mood or behavior, including suicidal thoughts or behavior  Respiratory depression (serious breathing problem that can be life-threatening), when used with narcotic pain medicines  Peripheral edema (swelling of your hands, ankles, feet, or lower legs)  Increased risk for cancer and bleeding  Serious muscle problems  Heart rhythm changes  This medicine may make you dizzy or drowsy. It may also cause blurry or double vision. Do not drive or do anything else that could be dangerous until you know how this medicine affects you. Do not stop using this medicine suddenly. Your doctor will need to slowly decrease your dose before you stop it completely. Your doctor will do lab tests at regular visits to check on the effects of this medicine. Keep all appointments. Keep all medicine out of the reach of children. Never share your medicine with anyone. Possible Side Effects While Using This Medicine:   Call your doctor right away if you notice any of these side effects:   Allergic reaction: Itching or hives, swelling in your face or hands, swelling or tingling in your mouth or throat, chest tightness, trouble breathing  Blistering, peeling, red skin rash  Blue lips, fingernails, or skin, trouble breathing, chest pain  Blurry or double vision  Fever, chills, cough, sore throat, body aches  Muscle pain, tenderness, or weakness, general feeling of illness  Rapid weight gain, swelling in your hands, ankles, or feet  Severe dizziness or drowsiness  Sudden mood changes, unusual moods or behavior, including extreme happiness or depression, thoughts or attempts of killing oneself  Swelling in your throat, head, or neck  Uneven heartbeat  Unusual bleeding, bruising, or weakness  If you notice these less serious side effects, talk with your doctor:   Confusion, trouble concentrating  Constipation  Dry mouth  If you notice other side effects that you think are caused by this medicine, tell your doctor. Call your doctor for medical advice about side effects. You may report side effects to FDA at 6-579-FDA-8354  © Copyright Ravi Bangura 2023 Information is for End User's use only and may not be sold, redistributed or otherwise used for commercial purposes. The above information is an  only. It is not intended as medical advice for individual conditions or treatments. Talk to your doctor, nurse or pharmacist before following any medical regimen to see if it is safe and effective for you. Core Strengthening Exercises   WHAT YOU NEED TO KNOW:   Your core includes the muscles of your lower back, hip, pelvis, and abdomen. Core strengthening exercises help heal and strengthen these muscles. This helps prevent another injury, and keeps your pelvis, spine, and hips in the correct position. DISCHARGE INSTRUCTIONS:   Call your doctor or physical therapist if:   You have sharp or worsening pain during exercise or at rest.    You have questions or concerns about your shoulder exercises.     Safety tips:  Talk to your healthcare provider before you start an exercise program. A physical therapist can teach you how to do core strengthening exercises safely. Do the exercises on a mat or firm surface. A firm surface will support your spine and prevent low back pain. Do not do these exercises on a bed. Move slowly and smoothly. Avoid fast or jerky motions. Stop if you feel pain. You may feel some discomfort at first, but you should not feel pain. Tell your provider or physical therapist if you have pain while you exercise. Regular exercise will help decrease your discomfort over time. Breathe normally during core exercises. Do not hold your breath. This may cause an increase in blood pressure and prevent muscle strengthening. Your healthcare provider will tell you when to inhale and exhale during the exercise. Begin all of your exercises with abdominal bracing. Abdominal bracing helps warm up your core muscles. You can also practice abdominal bracing throughout the day. Lie on your back with your knees bent and feet flat on the floor. Place your arms in a relaxed position beside your body. Tighten your abdominal muscles. Pull your belly button in and up toward your spine. Hold for 5 seconds. Relax your muscles. Repeat 10 times. Core strengthening exercises: Your healthcare provider will tell you how often to do these exercises. The provider will also tell you how many repetitions of each exercise you should do. Hold each exercise for 5 seconds or as directed. As you get stronger, increase your hold to 10 to 15 seconds. You can do some of these exercises on a stability ball, or with a weight. Ask your healthcare provider how to use a stability ball or weight for these exercises:  Bridging:  Lie on your back with your knees bent and feet flat on the floor. Rest your arms at your side. Tighten your buttocks, and then lift your hips 1 inch off the floor. Hold for 5 seconds. When you can do this exercise without pain for 10 seconds, increase the distance you lift your hips.  A good goal is to be able to lift your hips so that your shoulders, hips, and knees are in a straight line. Dead bug:  Lie on your back with your knees bent and feet flat on the floor. Place your arms in a relaxed position beside your body. Begin with abdominal bracing. Next, raise one leg, keeping your knee bent. Hold for 5 seconds. Repeat with the other leg. When you can do this exercise without pain for 10 to 15 seconds, you may raise one straight leg and hold. Repeat with the other leg. Quadruped:  Place your hands and knees on the floor. Keep your wrists directly below your shoulders and your knees directly below your hips. Pull your belly button in toward your spine. Do not flatten or arch your back. Tighten your abdominal muscles below your belly button. Hold for 5 seconds. When you can do this exercise without pain for 10 to 15 seconds, you may extend one arm and hold. Repeat on the other side. Side bridge exercises:      Standing side bridge:  Stand next to a wall and extend one arm toward the wall. Place your palm flat on the wall with your fingers pointing upward. Begin with abdominal bracing. Next, without moving your feet, slowly bend your arm to 90 degrees. Hold for 5 seconds. Repeat on the other side. When you can do this exercise without pain for 10 to 15 seconds, you may do the bent leg side bridge on the floor. Bent leg side bridge:  Lie on one side with your legs, hips, and shoulders in a straight line. Prop yourself up onto your forearm so your elbow is directly below your shoulder. Bend your knees back to 90 degrees. Begin with abdominal bracing. Next, lift your hips and balance yourself on your forearm and knees. Hold for 5 seconds. Repeat on the other side. When you can do this exercise without pain for 10 to 15 seconds, you may do the straight leg side bridge on the floor. Straight leg side bridge:  Lie on one side with your legs, hips, and shoulders in a straight line.  Prop yourself up onto your forearm so your elbow is directly below your shoulder. Begin with abdominal bracing. Lift your hips off the floor and balance yourself on your forearm and the outside of your flexed foot. Do not let your ankle bend sideways. Hold for 5 seconds. Repeat on the other side. When you can do this exercise without pain for 10 to 15 seconds, ask your healthcare provider for more advanced exercises. Superman:  Lie on your stomach. Extend your arms forward on the floor. Tighten your abdominal muscles and lift your right hand and left leg off the floor. Hold this position. Slowly return to the starting position. Tighten your abdominal muscles and lift your left hand and right leg off the floor. Hold this position. Slowly return to the starting position. Clam:  Lie on your side with your knees bent. Put your bottom arm under your head to keep your neck in line. Put your top hand on your hip to keep your pelvis from moving. Put your heels together, and keep them together during this exercise. Slowly raise your top knee toward the ceiling. Then lower your leg so your knees are together. Repeat this exercise 10 times. Then switch sides and do the exercise 10 times with the other leg. Curl up:  Lie on your back with your knees bent and feet flat on the floor. Place your hands, palms down, underneath your lower back. Next, with your elbows on the floor, lift your shoulders and chest 2 to 3 inches off the floor. Keep your head in line with your shoulders. Hold this position. Slowly return to the starting position. Straight leg raises:  Lie on your back with one leg straight. Bend the other knee and place your foot flat on the floor. Tighten your abdominal muscles. Keep your leg straight and slowly lift it straight up 6 to 12 inches off the floor. Hold this position. Lower your leg slowly. Do as many repetitions as directed on this side. Repeat with the other leg. Plank:  Lie on your stomach.  Kyron your elbows and place your forearms flat on the floor. Lift your chest, stomach, and knees off the floor. Make sure your elbows are below your shoulders. Your body should be in a straight line. Do not let your hips or lower back sink to the ground. Squeeze your abdominal muscles together and hold for 15 seconds. To make this exercise harder, hold for 30 seconds or lift 1 leg at a time. Bicycles:  Lie on your back. Bend both knees and bring them toward your chest. Your calves should be parallel to the floor. Place the palms of your hands on the back of your head. Straighten your right leg and keep it lifted 2 inches off the floor. Raise your head and shoulders off the floor and twist towards your left. Keep your head and shoulders lifted. Bend your right knee while you straighten your left leg. Keep your left leg 2 inches off the floor. Twist your head and chest towards the left leg. Continue to straighten 1 leg at a time and twist.       Follow up with your doctor or physical therapist as directed:  Write down your questions so you remember to ask them during your visits. © Copyright Chadd Amira 2023 Information is for End User's use only and may not be sold, redistributed or otherwise used for commercial purposes. The above information is an  only. It is not intended as medical advice for individual conditions or treatments. Talk to your doctor, nurse or pharmacist before following any medical regimen to see if it is safe and effective for you.

## 2023-10-13 NOTE — PROGRESS NOTES
Assessment  1. Postlaminectomy syndrome    2. Lumbar spondylosis    3. Lumbar radiculopathy    Greater than 80% relief of pain with improved ability to participate with IADLs after ILESI at L3-L4. Prior injection at L5-S1 helped for approximately 4 months. Previously described symptoms more consistent with spinal stenosis/neurogenic claudication from severe stenosis noted at L3-L4 on 3/23 MRI. Describes shopping cart sign, back pain and stiffness with prolonged activity. Previously reported the following symptomatology:     Axial left low back pain described primarily by arthritic features. Aching, nagging, indolent, stabbing, throbbing features in axial left low back with bilateral L5 and S1 radicular (left greater than right) components. 5/5 strength bilaterally, negative SLR. Positive facet loading maneuvers in lumbar spine elicited pain, positive tenderness to palpation over lumbar paraspinal muscles. Positive iram's, gaenslen's, SIJ loading left sided as well as ritesh finger and compression test.  Prior L4-L5 fusion in 2016. On MRI at L3-L4 moderate central canal stenosis with mild bilateral neuroforaminal stenosis, at L4-L5 grade 1 spondylolisthesis of L4 on L5 with postsurgical changes, posterior central disc herniation. At L5-S1 a left central disc herniation leading to mild left sided neural foraminal stenosis, and small synovial cyst abutting left S1 spinal nerve. Currently she is neurologically intact without gait instability, saddle anesthesia or bowel/bladder abnormality. Risks, benefits alternative to ILESI thoroughly discussed with patient. Handouts provided questions answered to patient satisfaction. Will additionally obtain imaging to rule out hardware instability or new disc degeneration at L5-S1.     Plan  -f/u prn  -DXA spine hip and pelvis; f/u result with patient  -gabapentin 300mg TID ordered for patient; counseled regarding sedative effects of taking this medication and provided up titration calendar. Counseled not to take medication while driving or operating heavy machinery/using stairs  -Physician directed home exercise plan as per [employer] demonstrated and handouts provided that patient plans to participate with for 1 hour, twice a week for the next 6 weeks. There are risks associated with opioid medications, including dependence, addiction and tolerance. The patient understands and agrees to use these medications only as prescribed. Potential side effects of the medications include, but are not limited to, constipation, drowsiness, addiction, impaired judgment and risk of fatal overdose if not taken as prescribed. The patient was warned against driving while taking sedation medications or operating heavy machinery. The patient voiced understanding. Sharing medications is a felony. At this point in time, the patient is showing no signs of addiction, abuse, diversion or suicidal ideation. [place] Prescription Drug Monitoring Program report was reviewed and was appropriate      Complete risks and benefits including bleeding, infection, tissue reaction, nerve injury and allergic reaction were discussed. The approach was demonstrated using models and literature was provided. Verbal and written consent was obtained. My impressions and treatment recommendations were discussed in detail with the patient who verbalized understanding and had no further questions. Discharge instructions were provided. I personally saw and examined the patient and I agree with the above discussed plan of care. No orders of the defined types were placed in this encounter. History of Present Illness    Greater than 80% relief of pain with improved ability to participate with IADLs after ILESI at L3-L4. Prior injection at L5-S1 helped for approximately 4 months.  Previously described symptoms more consistent with spinal stenosis/neurogenic claudication from severe stenosis noted at L3-L4 on 3/23 MRI. Describes shopping cart sign, back pain and stiffness with prolonged activity. Previously reported the following symptomatology:     Maru Poon is a 60 y.o. female with pmhx of factor V leiden deficiency on xarelto (DVT, PE in 2014, prior L4-L5 fusion in presenting with chronic left greater than right low back pain described primarily as arthritic in nature. She describes 8/10 low back pain that is worse in the mornings and worse at the end of the day. The pain is characterized by achy, nagging, indolent, crampy, stabbing pain in her left sided axial low back. The patient describes that the pain is worse with standing for long periods of time on hard surfaces as well as with walking. The patient is a very active individual and feels as though this pain compromises her participation with independent activities of daily living. The pain can be debilitating at times and contribute to significant disability, compromising overall activity and independent activities of daily living. She has tried physical therapy with limited relief of symptoms. Medications the patient has tried in the past include nsaids, tylenol. She describes no radicular symptoms and has good strength. She endoreses pain limited weakness in bilateral L5 and S1 dermatomes accompanied by numbness and paresthesias. The patient denies any bowel or bladder dysfunction as well, saddle anesthesia or gait instability. I have personally reviewed and/or updated the patient's past medical history, past surgical history, family history, social history, current medications, allergies, and vital signs today. Review of Systems   Constitutional:  Positive for activity change. HENT: Negative. Eyes: Negative. Respiratory: Negative. Cardiovascular: Negative. Gastrointestinal: Negative. Endocrine: Negative. Genitourinary: Negative. Musculoskeletal:  Positive for arthralgias, back pain and myalgias. Negative for gait problem. Skin: Negative. Allergic/Immunologic: Negative. Neurological:  Negative for weakness and numbness. Hematological: Negative. Psychiatric/Behavioral: Negative. All other systems reviewed and are negative. Patient Active Problem List   Diagnosis    Postlaminectomy syndrome    Lumbar spondylosis    Sacroiliitis (HCC)    Lumbar radiculopathy       Past Medical History:   Diagnosis Date    Back pain     DVT (deep vein thrombosis) in pregnancy     Factor 5 Leiden mutation, heterozygous (HCC)     High cholesterol     Pulmonary embolism (HCC)        Past Surgical History:   Procedure Laterality Date    BACK SURGERY      EPIDURAL BLOCK INJECTION N/A 4/18/2023    Procedure: BLOCK / INJECTION EPIDURAL STEROID LUMBAR L5-S1;  Surgeon: Rosalio Moreno MD;  Location: [facility];  Service: Pain Management     EPIDURAL BLOCK INJECTION N/A 9/28/2023    Procedure: BLOCK / INJECTION EPIDURAL STEROID LUMBAR L3-L4;  Surgeon: Rosalio Moreno MD;  Location: [facility];  Service: Pain Management     IR DVT THROMBOLYSIS/THROMBECTOMY LOWER EXTREMITY WITH VENOGRAM      ME INJECT SI JOINT ARTHRGRPHY&/ANES/STEROID W/RYDER Left 03/16/2023    Procedure: BLOCK / INJECTION SACROILIAC;  Surgeon: Rosalio Moreno MD;  Location: [facility];  Service: Pain Management        History reviewed. No pertinent family history.     Social History     Occupational History    Not on file   Tobacco Use    Smoking status: Never    Smokeless tobacco: Never   Vaping Use    Vaping Use: Never used   Substance and Sexual Activity    Alcohol use: Yes     Comment: social    Drug use: Never    Sexual activity: Not on file       Current Outpatient Medications on File Prior to Visit   Medication Sig    atorvastatin (LIPITOR) 20 mg tablet Take 20 mg by mouth    multivitamin (THERAGRAN) TABS Take 1 tablet by mouth daily    rivaroxaban (XARELTO) 10 mg tablet Take 1 tablet by mouth daily    [DISCONTINUED] cephalexin (KEFLEX) 500 mg capsule Take 500 mg by mouth 4 (four) times a day (Patient not taking: Reported on 10/13/2023)    [DISCONTINUED] gabapentin (NEURONTIN) 300 mg capsule Take 1 capsule (300 mg total) by mouth 3 (three) times a day (Patient not taking: Reported on 10/13/2023)    [DISCONTINUED] methocarbamol (ROBAXIN) 500 mg tablet  (Patient not taking: Reported on 8/30/2023)     No current facility-administered medications on file prior to visit. Allergies   Allergen Reactions    Ampicillin Rash    Sulfa Antibiotics Rash       Physical Exam    /88 (BP Location: Left arm, Patient Position: Sitting, Cuff Size: Adult)   Pulse 80   Temp 98 °F (36.7 °C)   Ht 5' 5" (1.651 m)   Wt 88.9 kg (196 lb)   SpO2 96%   BMI 32.62 kg/m²     Constitutional: normal, well developed, well nourished, alert, in no distress and non-toxic and no overt pain behavior. and obese  Eyes: anicteric  HEENT: grossly intact  Neck: supple, symmetric, trachea midline and no masses   Pulmonary:even and unlabored  Cardiovascular:No edema or pitting edema present  Skin:Normal without rashes or lesions and well hydrated  Psychiatric:Mood and affect appropriate  Neurologic:Cranial Nerves II-XII grossly intact Sensation grossly intact; no clonus negative piper's. Reflexes 2+ and brisk. SLR negative bilaterally. Musculoskeletal:normal gait. 5/5 strength bilaterally with AROM in lower extremities. Normal heel toe and tip toe walking. Significant pain with lumbar facet loading bilaterally and with lateral spine rotation. No ttp over lumbar paraspinal muscles. Positive iram's test,  gaenslen's SIJ loading left sided bilaterally. Imaging     On MRI at L3-L4 moderate central canal stenosis with mild bilateral neuroforaminal stenosis, at L4-L5 grade 1 spondylolisthesis of L4 on L5 with postsurgical changes, posterior central disc herniation.  At L5-S1 a left central disc herniation leading to mild left sided neural foraminal stenosis, and small synovial cyst abutting left S1 spinal nerve. independent

## 2023-10-16 ENCOUNTER — HOSPITAL ENCOUNTER (OUTPATIENT)
Dept: RADIOLOGY | Facility: CLINIC | Age: 58
Discharge: HOME | End: 2023-10-16
Payer: COMMERCIAL

## 2023-10-16 DIAGNOSIS — Z78.0 POST-MENOPAUSAL: ICD-10-CM

## 2023-10-16 DIAGNOSIS — M81.0 OSTEOPOROSIS, UNSPECIFIED OSTEOPOROSIS TYPE, UNSPECIFIED PATHOLOGICAL FRACTURE PRESENCE: ICD-10-CM

## 2023-10-16 PROCEDURE — 77080 DXA BONE DENSITY AXIAL: CPT

## 2023-10-18 ENCOUNTER — TELEPHONE (OUTPATIENT)
Dept: RADIOLOGY | Facility: CLINIC | Age: 58
End: 2023-10-18

## 2023-10-18 NOTE — TELEPHONE ENCOUNTER
----- Message from Rosa Chaparro MD sent at 10/18/2023 11:32 AM EDT -----  Has osteopenia, should f/u with pcp regarding calcium and vitamin d recommendations  ----- Message -----  From: Interface, Radiology Results In  Sent: 10/18/2023  11:22 AM EDT  To: Rosa Chaparro MD

## 2023-10-18 NOTE — TELEPHONE ENCOUNTER
Caller: Misael Pina     Doctor: Dr Agueda Estes    Reason for call: Patient returning call from RN     Call back#: [phone]

## 2024-01-15 ENCOUNTER — OFFICE VISIT (OUTPATIENT)
Dept: URGENT CARE | Facility: CLINIC | Age: 59
End: 2024-01-15
Payer: COMMERCIAL

## 2024-01-15 VITALS
RESPIRATION RATE: 16 BRPM | BODY MASS INDEX: 33.32 KG/M2 | OXYGEN SATURATION: 96 % | HEIGHT: 65 IN | TEMPERATURE: 98.6 F | SYSTOLIC BLOOD PRESSURE: 160 MMHG | HEART RATE: 67 BPM | DIASTOLIC BLOOD PRESSURE: 90 MMHG | WEIGHT: 200 LBS

## 2024-01-15 DIAGNOSIS — N39.0 URINARY TRACT INFECTION WITH HEMATURIA, SITE UNSPECIFIED: Primary | ICD-10-CM

## 2024-01-15 DIAGNOSIS — R31.9 URINARY TRACT INFECTION WITH HEMATURIA, SITE UNSPECIFIED: Primary | ICD-10-CM

## 2024-01-15 LAB
SL AMB  POCT GLUCOSE, UA: NEGATIVE
SL AMB LEUKOCYTE ESTERASE,UA: ABNORMAL
SL AMB POCT BILIRUBIN,UA: NEGATIVE
SL AMB POCT BLOOD,UA: ABNORMAL
SL AMB POCT CLARITY,UA: ABNORMAL
SL AMB POCT COLOR,UA: ABNORMAL
SL AMB POCT KETONES,UA: NEGATIVE
SL AMB POCT NITRITE,UA: POSITIVE
SL AMB POCT PH,UA: 6
SL AMB POCT SPECIFIC GRAVITY,UA: 1.01
SL AMB POCT URINE PROTEIN: NEGATIVE
SL AMB POCT UROBILINOGEN: ABNORMAL

## 2024-01-15 PROCEDURE — 81002 URINALYSIS NONAUTO W/O SCOPE: CPT | Performed by: PHYSICIAN ASSISTANT

## 2024-01-15 PROCEDURE — 87186 SC STD MICRODIL/AGAR DIL: CPT | Performed by: PHYSICIAN ASSISTANT

## 2024-01-15 PROCEDURE — 87086 URINE CULTURE/COLONY COUNT: CPT | Performed by: PHYSICIAN ASSISTANT

## 2024-01-15 PROCEDURE — 87077 CULTURE AEROBIC IDENTIFY: CPT | Performed by: PHYSICIAN ASSISTANT

## 2024-01-15 PROCEDURE — G0382 LEV 3 HOSP TYPE B ED VISIT: HCPCS | Performed by: PHYSICIAN ASSISTANT

## 2024-01-15 RX ORDER — CIPROFLOXACIN 500 MG/1
500 TABLET, FILM COATED ORAL EVERY 12 HOURS SCHEDULED
Qty: 10 TABLET | Refills: 0 | Status: SHIPPED | OUTPATIENT
Start: 2024-01-15 | End: 2024-01-20

## 2024-01-15 RX ORDER — CHOLECALCIFEROL (VITAMIN D3) 100000/G
POWDER (GRAM) MISCELLANEOUS
COMMUNITY

## 2024-01-15 RX ORDER — PHENAZOPYRIDINE HYDROCHLORIDE 100 MG/1
100 TABLET, FILM COATED ORAL 3 TIMES DAILY PRN
Qty: 10 TABLET | Refills: 0 | Status: SHIPPED | OUTPATIENT
Start: 2024-01-15

## 2024-01-15 NOTE — PROGRESS NOTES
[facility]        NAME: June Edward is a 58 y.o. female  : 1965    MRN: [id number]  DATE: January 15, 2024  TIME: 4:53 PM    Assessment and Plan   Urinary tract infection with hematuria, site unspecified [N39.0, R31.9]  1. Urinary tract infection with hematuria, site unspecified  POCT urine dip    Urine culture    ciprofloxacin (CIPRO) 500 mg tablet    phenazopyridine (PYRIDIUM) 100 mg tablet            Patient Instructions     Take medicine as prescribed  Increase fluid intake  Follow up with PCP in 3-5 days.  Proceed to  ER if symptoms worsen.    Chief Complaint     Chief Complaint   Patient presents with    Possible UTI     Discomfort after urinating starting Friday. Denies any burning, lower abdominal pain, or back pain.          History of Present Illness       Urinary Tract Infection   This is a new problem. Episode onset: 24. The problem has been unchanged. There has been no fever. She is Not sexually active. Associated symptoms include frequency. Pertinent negatives include no chills, hematuria, hesitancy, nausea, sweats, urgency or vomiting. Associated symptoms comments: Dysuria, suprapubic pressure. She has tried increased fluids for the symptoms. There is no history of recurrent UTIs.       Review of Systems   Review of Systems   Constitutional:  Negative for chills, fatigue and fever.   Gastrointestinal:  Negative for nausea and vomiting.   Genitourinary:  Positive for dysuria and frequency. Negative for difficulty urinating, hematuria, hesitancy, urgency and vaginal discharge.         Current Medications       Current Outpatient Medications:     atorvastatin (LIPITOR) 20 mg tablet, Take 20 mg by mouth, Disp: , Rfl:     CALCIUM PO, , Disp: , Rfl:     Cholecalciferol (Vitamin D3) POWD, , Disp: , Rfl:     ciprofloxacin (CIPRO) 500 mg tablet, Take 1 tablet (500 mg total) by mouth every 12 (twelve) hours for 5 days, Disp: 10 tablet, Rfl: 0    multivitamin (THERAGRAN) TABS, Take 1  "tablet by mouth daily, Disp: , Rfl:     phenazopyridine (PYRIDIUM) 100 mg tablet, Take 1 tablet (100 mg total) by mouth 3 (three) times a day as needed for bladder spasms, Disp: 10 tablet, Rfl: 0    rivaroxaban (XARELTO) 10 mg tablet, Take 1 tablet by mouth daily, Disp: , Rfl:     Current Allergies     Allergies as of 01/15/2024 - Reviewed 01/15/2024   Allergen Reaction Noted    Ampicillin Rash 01/18/2013    Sulfa antibiotics Rash 01/18/2013            The following portions of the patient's history were reviewed and updated as appropriate: allergies, current medications, past family history, past medical history, past social history, past surgical history and problem list.     Past Medical History:   Diagnosis Date    Back pain     DVT (deep vein thrombosis) in pregnancy     Factor 5 Leiden mutation, heterozygous (HCC)     High cholesterol     Pulmonary embolism (HCC)        Past Surgical History:   Procedure Laterality Date    BACK SURGERY      DILATION AND CURETTAGE OF UTERUS      ENDOMETRIAL ABLATION      EPIDURAL BLOCK INJECTION N/A 04/18/2023    Procedure: BLOCK / INJECTION EPIDURAL STEROID LUMBAR L5-S1;  Surgeon: Kp Barry MD;  Location: [facility];  Service: Pain Management     EPIDURAL BLOCK INJECTION N/A 09/28/2023    Procedure: BLOCK / INJECTION EPIDURAL STEROID LUMBAR L3-L4;  Surgeon: Kp Barry MD;  Location: [facility];  Service: Pain Management     IR DVT THROMBOLYSIS/THROMBECTOMY LOWER EXTREMITY WITH VENOGRAM      GA INJECT SI JOINT ARTHRGRPHY&/ANES/STEROID W/RYDER Left 03/16/2023    Procedure: BLOCK / INJECTION SACROILIAC;  Surgeon: Kp Barry MD;  Location: [facility];  Service: Pain Management      No family history on file.  Medications have been verified.    Objective   /90   Pulse 67   Temp 98.6 °F (37 °C)   Resp 16   Ht 5' 5\" (1.651 m)   Wt 90.7 kg (200 lb)   SpO2 96%   BMI 33.28 kg/m²   No LMP recorded. Patient has had an ablation.    Physical Exam     Physical " Exam  Constitutional:       Appearance: Normal appearance.   Cardiovascular:      Rate and Rhythm: Normal rate and regular rhythm.   Pulmonary:      Effort: Pulmonary effort is normal. No respiratory distress.      Breath sounds: Normal breath sounds. No wheezing, rhonchi or rales.   Abdominal:      General: Abdomen is flat. There is no distension.      Palpations: Abdomen is soft. There is no mass.      Tenderness: There is no abdominal tenderness. There is no right CVA tenderness, left CVA tenderness, guarding or rebound.      Hernia: No hernia is present.   Neurological:      Mental Status: She is alert.

## 2024-01-17 LAB — BACTERIA UR CULT: ABNORMAL

## 2024-01-19 ENCOUNTER — OFFICE VISIT (OUTPATIENT)
Dept: PAIN MEDICINE | Facility: CLINIC | Age: 59
End: 2024-01-19
Payer: COMMERCIAL

## 2024-01-19 VITALS
HEIGHT: 65 IN | BODY MASS INDEX: 34.49 KG/M2 | TEMPERATURE: 98.1 F | HEART RATE: 85 BPM | DIASTOLIC BLOOD PRESSURE: 98 MMHG | OXYGEN SATURATION: 98 % | SYSTOLIC BLOOD PRESSURE: 140 MMHG | WEIGHT: 207 LBS

## 2024-01-19 DIAGNOSIS — M54.16 LUMBAR RADICULOPATHY: ICD-10-CM

## 2024-01-19 DIAGNOSIS — M47.816 LUMBAR SPONDYLOSIS: Primary | ICD-10-CM

## 2024-01-19 PROCEDURE — 99214 OFFICE O/P EST MOD 30 MIN: CPT | Performed by: ANESTHESIOLOGY

## 2024-01-19 RX ORDER — 0.9 % SODIUM CHLORIDE 0.9 %
1 VIAL (ML) INJECTION ONCE
OUTPATIENT
Start: 2024-01-19 | End: 2024-01-19

## 2024-01-19 RX ORDER — PREGABALIN 75 MG/1
75 CAPSULE ORAL 3 TIMES DAILY
Qty: 90 CAPSULE | Refills: 2 | Status: SHIPPED | OUTPATIENT
Start: 2024-01-19

## 2024-01-19 RX ORDER — LIDOCAINE HYDROCHLORIDE 10 MG/ML
10 INJECTION, SOLUTION EPIDURAL; INFILTRATION; INTRACAUDAL; PERINEURAL ONCE
OUTPATIENT
Start: 2024-01-19 | End: 2024-01-19

## 2024-01-19 NOTE — H&P (VIEW-ONLY)
Assessment  1. Lumbar spondylosis  -     pregabalin (LYRICA) 75 mg capsule; Take 1 capsule (75 mg total) by mouth 3 (three) times a day  -     Ambulatory referral to Physical Therapy; Future    2. Lumbar radiculopathy  -     pregabalin (LYRICA) 75 mg capsule; Take 1 capsule (75 mg total) by mouth 3 (three) times a day  -     Ambulatory referral to Physical Therapy; Future    Greater than 80% relief of pain with improved ability to participate with IADLs after ILESI at L3-L4. Prior injection at L5-S1 helped for approximately 4 months. Previously described symptoms more consistent with spinal stenosis/neurogenic claudication from severe stenosis noted at L3-L4 on 3/23 MRI. Now endorses radicular pain in bilateral L3 dermatome accompanied by pain limited weakness, numbness and paresthesias. Describes shopping cart sign, back pain and stiffness with prolonged activity. Nearly 2 months relief with prior ILESI at L3-L4. Previously reported the following symptomatology:     Axial left low back pain described primarily by arthritic features.  Aching, nagging, indolent, stabbing, throbbing features in axial left low back with bilateral L5 and S1 radicular (left greater than right) components.  5/5 strength bilaterally, negative SLR.  Positive facet loading maneuvers in lumbar spine elicited pain, positive tenderness to palpation over lumbar paraspinal muscles. Positive iram's, gaenslen's, SIJ loading left sided as well as ritesh finger and compression test.  Prior L4-L5 fusion in 2016.  On MRI at L3-L4 moderate central canal stenosis with mild bilateral neuroforaminal stenosis, at L4-L5 grade 1 spondylolisthesis of L4 on L5 with postsurgical changes, posterior central disc herniation. At L5-S1 a left central disc herniation leading to mild left sided neural foraminal stenosis, and small synovial cyst abutting left S1 spinal nerve. Currently she is neurologically intact without gait instability, saddle anesthesia or  bowel/bladder abnormality. Risks, benefits alternative to ILESI thoroughly discussed with patient.  Handouts provided questions answered to patient satisfaction. Will additionally obtain imaging to rule out hardware instability or new disc degeneration at L5-S1.    Plan  -Bilateral L3 TFESI; f/u 2 weeks post procedure  -DXA spine hip and pelvis showed osteopenia; will f/u with pcp for further recommendations  -gabapentin transitioned to lyrica 75mg TID ordered for patient; counseled regarding sedative effects of taking this medication and provided up titration calendar.  Counseled not to take medication while driving or operating heavy machinery/using stairs  -Physician directed home exercise plan as per [employer] demonstrated and handouts provided that patient plans to participate with for 1 hour, twice a week for the next 6 weeks.     There are risks associated with opioid medications, including dependence, addiction and tolerance. The patient understands and agrees to use these medications only as prescribed. Potential side effects of the medications include, but are not limited to, constipation, drowsiness, addiction, impaired judgment and risk of fatal overdose if not taken as prescribed. The patient was warned against driving while taking sedation medications or operating heavy machinery. The patient voiced understanding. Sharing medications is a felony. At this point in time, the patient is showing no signs of addiction, abuse, diversion or suicidal ideation.     [place] Prescription Drug Monitoring Program report was reviewed and was appropriate      Complete risks and benefits including bleeding, infection, tissue reaction, nerve injury and allergic reaction were discussed. The approach was demonstrated using models and literature was provided. Verbal and written consent was obtained.     My impressions and treatment recommendations were discussed in detail with the patient who verbalized understanding and  had no further questions.  Discharge instructions were provided. I personally saw and examined the patient and I agree with the above discussed plan of care.      No orders of the defined types were placed in this encounter.      History of Present Illness    Greater than 80% relief of pain with improved ability to participate with IADLs after ILESI at L3-L4. Prior injection at L5-S1 helped for approximately 4 months. Previously described symptoms more consistent with spinal stenosis/neurogenic claudication from severe stenosis noted at L3-L4 on 3/23 MRI. Now endorses radicular pain in bilateral L3 dermatome accompanied by pain limited weakness, numbness and paresthesias. Describes shopping cart sign, back pain and stiffness with prolonged activity. Nearly 2 months relief with prior ILESI at L3-L4. Previously reported the following symptomatology:     June Edward is a 58 y.o. female with pmhx of factor V leiden deficiency on xarelto (DVT, PE in 2014, prior L4-L5 fusion in presenting with chronic left greater than right low back pain described primarily as arthritic in nature. She describes 8/10 low back pain that is worse in the mornings and worse at the end of the day.  The pain is characterized by achy, nagging, indolent, crampy, stabbing pain in her left sided axial low back.  The patient describes that the pain is worse with standing for long periods of time on hard surfaces as well as with walking.  The patient is a very active individual and feels as though this pain compromises her participation with independent activities of daily living. The pain can be debilitating at times and contribute to significant disability, compromising overall activity and independent activities of daily living.  She has tried physical therapy with limited relief of symptoms.  Medications the patient has tried in the past include nsaids, tylenol. She describes no radicular symptoms and has good strength. She endoreses pain limited  weakness in bilateral L5 and S1 dermatomes accompanied by numbness and paresthesias.  The patient denies any bowel or bladder dysfunction as well, saddle anesthesia or gait instability.      I have personally reviewed and/or updated the patient's past medical history, past surgical history, family history, social history, current medications, allergies, and vital signs today.     Review of Systems   Constitutional:  Positive for activity change.   HENT: Negative.     Eyes: Negative.    Respiratory: Negative.     Cardiovascular: Negative.    Gastrointestinal: Negative.    Endocrine: Negative.    Genitourinary: Negative.    Musculoskeletal:  Positive for arthralgias, back pain and myalgias. Negative for gait problem.   Skin: Negative.    Allergic/Immunologic: Negative.    Neurological:  Negative for weakness and numbness.   Hematological: Negative.    Psychiatric/Behavioral: Negative.     All other systems reviewed and are negative.      Patient Active Problem List   Diagnosis    Postlaminectomy syndrome    Lumbar spondylosis    Sacroiliitis (HCC)    Lumbar radiculopathy       Past Medical History:   Diagnosis Date    Back pain     DVT (deep vein thrombosis) in pregnancy     Factor 5 Leiden mutation, heterozygous (HCC)     High cholesterol     Pulmonary embolism (HCC)        Past Surgical History:   Procedure Laterality Date    BACK SURGERY      DILATION AND CURETTAGE OF UTERUS      ENDOMETRIAL ABLATION      EPIDURAL BLOCK INJECTION N/A 04/18/2023    Procedure: BLOCK / INJECTION EPIDURAL STEROID LUMBAR L5-S1;  Surgeon: Kp Barry MD;  Location:  [facility];  Service: Pain Management     EPIDURAL BLOCK INJECTION N/A 09/28/2023    Procedure: BLOCK / INJECTION EPIDURAL STEROID LUMBAR L3-L4;  Surgeon: Kp Barry MD;  Location:  [facility];  Service: Pain Management     IR DVT THROMBOLYSIS/THROMBECTOMY LOWER EXTREMITY WITH VENOGRAM      SD INJECT SI JOINT ARTHRGRPHY&/ANES/STEROID W/RYDER Left 03/16/2023    Procedure:  "BLOCK / INJECTION SACROILIAC;  Surgeon: Kp Barry MD;  Location: [facility];  Service: Pain Management        History reviewed. No pertinent family history.    Social History     Occupational History    Not on file   Tobacco Use    Smoking status: Never    Smokeless tobacco: Never   Vaping Use    Vaping status: Some Days    Substances: THC   Substance and Sexual Activity    Alcohol use: Yes     Comment: social    Drug use: Yes     Types: Marijuana    Sexual activity: Not on file       Current Outpatient Medications on File Prior to Visit   Medication Sig    atorvastatin (LIPITOR) 20 mg tablet Take 20 mg by mouth    CALCIUM PO     Cholecalciferol (Vitamin D3) POWD     ciprofloxacin (CIPRO) 500 mg tablet Take 1 tablet (500 mg total) by mouth every 12 (twelve) hours for 5 days    multivitamin (THERAGRAN) TABS Take 1 tablet by mouth daily    rivaroxaban (XARELTO) 10 mg tablet Take 1 tablet by mouth daily    phenazopyridine (PYRIDIUM) 100 mg tablet Take 1 tablet (100 mg total) by mouth 3 (three) times a day as needed for bladder spasms (Patient not taking: Reported on 1/19/2024)     No current facility-administered medications on file prior to visit.       Allergies   Allergen Reactions    Ampicillin Rash    Sulfa Antibiotics Rash       Physical Exam    /98 (BP Location: Left arm, Patient Position: Sitting, Cuff Size: Adult)   Pulse 85   Temp 98.1 °F (36.7 °C)   Ht 5' 5\" (1.651 m)   Wt 93.9 kg (207 lb)   SpO2 98%   BMI 34.45 kg/m²     Constitutional: normal, well developed, well nourished, alert, in no distress and non-toxic and no overt pain behavior. and obese  Eyes: anicteric  HEENT: grossly intact  Neck: supple, symmetric, trachea midline and no masses   Pulmonary:even and unlabored  Cardiovascular:No edema or pitting edema present  Skin:Normal without rashes or lesions and well hydrated  Psychiatric:Mood and affect appropriate  Neurologic:Cranial Nerves II-XII grossly intact Sensation grossly " intact; no clonus negative piper's. Reflexes 2+ and brisk. SLR negative bilaterally.  Musculoskeletal:normal gait. 5/5 strength bilaterally with AROM in lower extremities. Normal heel toe and tip toe walking. Significant pain with lumbar facet loading bilaterally and with lateral spine rotation. No ttp over lumbar paraspinal muscles. Positive iram's test,  gaenslen's SIJ loading left sided bilaterally.    Imaging     On MRI at L3-L4 moderate central canal stenosis with mild bilateral neuroforaminal stenosis, at L4-L5 grade 1 spondylolisthesis of L4 on L5 with postsurgical changes, posterior central disc herniation. At L5-S1 a left central disc herniation leading to mild left sided neural foraminal stenosis, and small synovial cyst abutting left S1 spinal nerve.

## 2024-01-19 NOTE — PROGRESS NOTES
Assessment  1. Lumbar spondylosis  -     pregabalin (LYRICA) 75 mg capsule; Take 1 capsule (75 mg total) by mouth 3 (three) times a day  -     Ambulatory referral to Physical Therapy; Future    2. Lumbar radiculopathy  -     pregabalin (LYRICA) 75 mg capsule; Take 1 capsule (75 mg total) by mouth 3 (three) times a day  -     Ambulatory referral to Physical Therapy; Future    Greater than 80% relief of pain with improved ability to participate with IADLs after ILESI at L3-L4. Prior injection at L5-S1 helped for approximately 4 months. Previously described symptoms more consistent with spinal stenosis/neurogenic claudication from severe stenosis noted at L3-L4 on 3/23 MRI. Describes shopping cart sign, back pain and stiffness with prolonged activity. Nearly 2 months relief with prior ILESI at L3-L4. Previously reported the following symptomatology:     Axial left low back pain described primarily by arthritic features.  Aching, nagging, indolent, stabbing, throbbing features in axial left low back with bilateral L5 and S1 radicular (left greater than right) components.  5/5 strength bilaterally, negative SLR.  Positive facet loading maneuvers in lumbar spine elicited pain, positive tenderness to palpation over lumbar paraspinal muscles. Positive iram's, gaenslen's, SIJ loading left sided as well as ritesh finger and compression test.  Prior L4-L5 fusion in 2016.  On MRI at L3-L4 moderate central canal stenosis with mild bilateral neuroforaminal stenosis, at L4-L5 grade 1 spondylolisthesis of L4 on L5 with postsurgical changes, posterior central disc herniation. At L5-S1 a left central disc herniation leading to mild left sided neural foraminal stenosis, and small synovial cyst abutting left S1 spinal nerve. Currently she is neurologically intact without gait instability, saddle anesthesia or bowel/bladder abnormality. Risks, benefits alternative to ILESI thoroughly discussed with patient.  Handouts provided  questions answered to patient satisfaction. Will additionally obtain imaging to rule out hardware instability or new disc degeneration at L5-S1.    Plan  -Bilateral L3 TFESI; f/u 2 weeks post procedure  -DXA spine hip and pelvis showed osteopenia; will f/u with pcp for further recommendations  -gabapentin transitioned to lyrica 75mg TID ordered for patient; counseled regarding sedative effects of taking this medication and provided up titration calendar.  Counseled not to take medication while driving or operating heavy machinery/using stairs  -Physician directed home exercise plan as per [employer] demonstrated and handouts provided that patient plans to participate with for 1 hour, twice a week for the next 6 weeks.     There are risks associated with opioid medications, including dependence, addiction and tolerance. The patient understands and agrees to use these medications only as prescribed. Potential side effects of the medications include, but are not limited to, constipation, drowsiness, addiction, impaired judgment and risk of fatal overdose if not taken as prescribed. The patient was warned against driving while taking sedation medications or operating heavy machinery. The patient voiced understanding. Sharing medications is a felony. At this point in time, the patient is showing no signs of addiction, abuse, diversion or suicidal ideation.     [place] Prescription Drug Monitoring Program report was reviewed and was appropriate      Complete risks and benefits including bleeding, infection, tissue reaction, nerve injury and allergic reaction were discussed. The approach was demonstrated using models and literature was provided. Verbal and written consent was obtained.     My impressions and treatment recommendations were discussed in detail with the patient who verbalized understanding and had no further questions.  Discharge instructions were provided. I personally saw and examined the patient and I agree  with the above discussed plan of care.      No orders of the defined types were placed in this encounter.      History of Present Illness    Greater than 80% relief of pain with improved ability to participate with IADLs after ILESI at L3-L4. Prior injection at L5-S1 helped for approximately 4 months. Previously described symptoms more consistent with spinal stenosis/neurogenic claudication from severe stenosis noted at L3-L4 on 3/23 MRI. Describes shopping cart sign, back pain and stiffness with prolonged activity. Nearly 2 months relief with prior ILESI at L3-L4. Previously reported the following symptomatology:     June Edward is a 58 y.o. female with pmhx of factor V leiden deficiency on xarelto (DVT, PE in 2014, prior L4-L5 fusion in presenting with chronic left greater than right low back pain described primarily as arthritic in nature. She describes 8/10 low back pain that is worse in the mornings and worse at the end of the day.  The pain is characterized by achy, nagging, indolent, crampy, stabbing pain in her left sided axial low back.  The patient describes that the pain is worse with standing for long periods of time on hard surfaces as well as with walking.  The patient is a very active individual and feels as though this pain compromises her participation with independent activities of daily living. The pain can be debilitating at times and contribute to significant disability, compromising overall activity and independent activities of daily living.  She has tried physical therapy with limited relief of symptoms.  Medications the patient has tried in the past include nsaids, tylenol. She describes no radicular symptoms and has good strength. She endoreses pain limited weakness in bilateral L5 and S1 dermatomes accompanied by numbness and paresthesias.  The patient denies any bowel or bladder dysfunction as well, saddle anesthesia or gait instability.      I have personally reviewed and/or updated the  patient's past medical history, past surgical history, family history, social history, current medications, allergies, and vital signs today.     Review of Systems   Constitutional:  Positive for activity change.   HENT: Negative.     Eyes: Negative.    Respiratory: Negative.     Cardiovascular: Negative.    Gastrointestinal: Negative.    Endocrine: Negative.    Genitourinary: Negative.    Musculoskeletal:  Positive for arthralgias, back pain and myalgias. Negative for gait problem.   Skin: Negative.    Allergic/Immunologic: Negative.    Neurological:  Negative for weakness and numbness.   Hematological: Negative.    Psychiatric/Behavioral: Negative.     All other systems reviewed and are negative.      Patient Active Problem List   Diagnosis    Postlaminectomy syndrome    Lumbar spondylosis    Sacroiliitis (HCC)    Lumbar radiculopathy       Past Medical History:   Diagnosis Date    Back pain     DVT (deep vein thrombosis) in pregnancy     Factor 5 Leiden mutation, heterozygous (HCC)     High cholesterol     Pulmonary embolism (HCC)        Past Surgical History:   Procedure Laterality Date    BACK SURGERY      DILATION AND CURETTAGE OF UTERUS      ENDOMETRIAL ABLATION      EPIDURAL BLOCK INJECTION N/A 04/18/2023    Procedure: BLOCK / INJECTION EPIDURAL STEROID LUMBAR L5-S1;  Surgeon: Kp Barry MD;  Location:  [facility];  Service: Pain Management     EPIDURAL BLOCK INJECTION N/A 09/28/2023    Procedure: BLOCK / INJECTION EPIDURAL STEROID LUMBAR L3-L4;  Surgeon: Kp Barry MD;  Location:  [facility];  Service: Pain Management     IR DVT THROMBOLYSIS/THROMBECTOMY LOWER EXTREMITY WITH VENOGRAM      MS INJECT SI JOINT ARTHRGRPHY&/ANES/STEROID W/RYDER Left 03/16/2023    Procedure: BLOCK / INJECTION SACROILIAC;  Surgeon: Kp Barry MD;  Location:  [facility];  Service: Pain Management        History reviewed. No pertinent family history.    Social History     Occupational History    Not on file   Tobacco Use     "Smoking status: Never    Smokeless tobacco: Never   Vaping Use    Vaping status: Some Days    Substances: THC   Substance and Sexual Activity    Alcohol use: Yes     Comment: social    Drug use: Yes     Types: Marijuana    Sexual activity: Not on file       Current Outpatient Medications on File Prior to Visit   Medication Sig    atorvastatin (LIPITOR) 20 mg tablet Take 20 mg by mouth    CALCIUM PO     Cholecalciferol (Vitamin D3) POWD     ciprofloxacin (CIPRO) 500 mg tablet Take 1 tablet (500 mg total) by mouth every 12 (twelve) hours for 5 days    multivitamin (THERAGRAN) TABS Take 1 tablet by mouth daily    rivaroxaban (XARELTO) 10 mg tablet Take 1 tablet by mouth daily    phenazopyridine (PYRIDIUM) 100 mg tablet Take 1 tablet (100 mg total) by mouth 3 (three) times a day as needed for bladder spasms (Patient not taking: Reported on 1/19/2024)     No current facility-administered medications on file prior to visit.       Allergies   Allergen Reactions    Ampicillin Rash    Sulfa Antibiotics Rash       Physical Exam    /98 (BP Location: Left arm, Patient Position: Sitting, Cuff Size: Adult)   Pulse 85   Temp 98.1 °F (36.7 °C)   Ht 5' 5\" (1.651 m)   Wt 93.9 kg (207 lb)   SpO2 98%   BMI 34.45 kg/m²     Constitutional: normal, well developed, well nourished, alert, in no distress and non-toxic and no overt pain behavior. and obese  Eyes: anicteric  HEENT: grossly intact  Neck: supple, symmetric, trachea midline and no masses   Pulmonary:even and unlabored  Cardiovascular:No edema or pitting edema present  Skin:Normal without rashes or lesions and well hydrated  Psychiatric:Mood and affect appropriate  Neurologic:Cranial Nerves II-XII grossly intact Sensation grossly intact; no clonus negative piper's. Reflexes 2+ and brisk. SLR negative bilaterally.  Musculoskeletal:normal gait. 5/5 strength bilaterally with AROM in lower extremities. Normal heel toe and tip toe walking. Significant pain with lumbar " facet loading bilaterally and with lateral spine rotation. No ttp over lumbar paraspinal muscles. Positive iram's test,  gaenslen's SIJ loading left sided bilaterally.    Imaging     On MRI at L3-L4 moderate central canal stenosis with mild bilateral neuroforaminal stenosis, at L4-L5 grade 1 spondylolisthesis of L4 on L5 with postsurgical changes, posterior central disc herniation. At L5-S1 a left central disc herniation leading to mild left sided neural foraminal stenosis, and small synovial cyst abutting left S1 spinal nerve.

## 2024-01-24 ENCOUNTER — PREP FOR PROCEDURE (OUTPATIENT)
Dept: PAIN MEDICINE | Facility: CLINIC | Age: 59
End: 2024-01-24

## 2024-01-24 ENCOUNTER — TELEPHONE (OUTPATIENT)
Dept: PAIN MEDICINE | Facility: CLINIC | Age: 59
End: 2024-01-24

## 2024-01-24 DIAGNOSIS — M47.816 LUMBAR SPONDYLOSIS: ICD-10-CM

## 2024-01-24 DIAGNOSIS — M54.16 LUMBAR RADICULOPATHY: Primary | ICD-10-CM

## 2024-01-24 NOTE — TELEPHONE ENCOUNTER
Spoke to patient and scheduled her procedure for 2/6/24 and went over all instructions including her Xarelto hold, she will take last dose on 2/2/24 and resume after she gets home on 2/6/24. She expressed verbal understanding of all instructions.

## 2024-02-13 ENCOUNTER — HOSPITAL ENCOUNTER (OUTPATIENT)
Dept: INTERVENTIONAL RADIOLOGY/VASCULAR | Facility: HOSPITAL | Age: 59
Discharge: HOME | End: 2024-02-13
Attending: ANESTHESIOLOGY | Admitting: ANESTHESIOLOGY
Payer: COMMERCIAL

## 2024-02-13 VITALS
BODY MASS INDEX: 34.49 KG/M2 | WEIGHT: 207 LBS | TEMPERATURE: 98.3 F | OXYGEN SATURATION: 98 % | HEART RATE: 80 BPM | DIASTOLIC BLOOD PRESSURE: 84 MMHG | SYSTOLIC BLOOD PRESSURE: 149 MMHG | HEIGHT: 65 IN | RESPIRATION RATE: 20 BRPM

## 2024-02-13 DIAGNOSIS — M47.816 LUMBAR SPONDYLOSIS: ICD-10-CM

## 2024-02-13 DIAGNOSIS — M54.16 LUMBAR RADICULOPATHY: ICD-10-CM

## 2024-02-13 PROCEDURE — 64483 NJX AA&/STRD TFRM EPI L/S 1: CPT | Performed by: ANESTHESIOLOGY

## 2024-02-13 RX ORDER — LIDOCAINE HYDROCHLORIDE 10 MG/ML
INJECTION, SOLUTION EPIDURAL; INFILTRATION; INTRACAUDAL; PERINEURAL AS NEEDED
Status: COMPLETED | OUTPATIENT
Start: 2024-02-13 | End: 2024-02-13

## 2024-02-13 RX ORDER — BETAMETHASONE SODIUM PHOSPHATE AND BETAMETHASONE ACETATE 3; 3 MG/ML; MG/ML
INJECTION, SUSPENSION INTRA-ARTICULAR; INTRALESIONAL; INTRAMUSCULAR; SOFT TISSUE AS NEEDED
Status: COMPLETED | OUTPATIENT
Start: 2024-02-13 | End: 2024-02-13

## 2024-02-13 RX ADMIN — BETAMETHASONE SODIUM PHOSPHATE AND BETAMETHASONE ACETATE 30 MG: 3; 3 INJECTION, SUSPENSION INTRA-ARTICULAR; INTRALESIONAL; INTRAMUSCULAR at 11:21

## 2024-02-13 RX ADMIN — IOHEXOL 2 ML: 240 INJECTION, SOLUTION INTRATHECAL; INTRAVASCULAR; INTRAVENOUS; ORAL at 11:22

## 2024-02-13 RX ADMIN — LIDOCAINE HYDROCHLORIDE 10 ML: 10 INJECTION, SOLUTION EPIDURAL; INFILTRATION; INTRACAUDAL; PERINEURAL at 11:21

## 2024-02-13 NOTE — INTERVAL H&P NOTE
H&P reviewed. After examining the patient I find no changes in the patients condition since the H&P had been written.    Vitals:    02/13/24 1109   BP: 156/90   Pulse: 98   Resp: 20   Temp: 98.3 °F (36.8 °C)   SpO2: 98%

## 2024-02-13 NOTE — DISCHARGE INSTR - AVS FIRST PAGE
YOUR 2 WEEK FOLLOW UP HAS BEEN SCHEDULED; IF YOU WISH TO CHANGE THE FOLLOW UP, PLEASE CALL THE [facility] AT [place]: [phone]    Epidural Steroid Injection   WHAT YOU NEED TO KNOW:   An epidural steroid injection (AMANDA) is a procedure to inject steroid medicine into the epidural space. The epidural space is between your spinal cord and vertebrae. Steroids reduce inflammation and fluid buildup in your spine that may be causing pain. You may be given pain medicine along with the steroids.        DISCHARGE INSTRUCTIONS:   Call your local emergency number (911 in the [place]) if:   You have a seizure.    You have trouble moving your legs.    Seek care immediately if:   Blood soaks through your bandage.    You have a fever or chills, severe back pain, and the procedure area is sensitive to the touch.    You cannot control when you urinate or have a bowel movement.    Call your doctor if:   You have weakness or numbness in your legs.    Your wound is red, swollen, or draining pus.    You have nausea or are vomiting.    Your face or neck is red and you feel warm.    You have more pain than you had before the procedure.    You have swelling in your hands or feet.    You have questions or concerns about your condition or care.    Care for your wound as directed:  You may remove the bandage before you go to bed the day of your procedure. You may take a shower, but do not take a bath for at least 24 hours.   Self-care:   Do not drive,  use machines, or do strenuous activity for 24 hours after your procedure or as directed.     Continue other treatments  as directed. Steroid injections alone will not control your pain. The injections are meant to be used with other treatments, such as physical therapy.    Follow up with your doctor as directed:  Write down your questions so you remember to ask them during your visits.     EPIDURAL STEROID INJECTION DISCHARGE INSTRUCTIONS      ACTIVITY  Do not drive or operate  machinery today.  No strenuous activity today - bending, lifting, etc.   You may resume normal activities starting tomorrow - start slowly and as tolerated.  You may shower today, but not tub baths or hot tubs.  You may have numbness for several hours from the local anesthetics. Please use caution and common sense, especially with weight-bearing activities.    CARE OF THE INJECTION SITE  If you have soreness or pain apply ice to the area today (20 minutes on and 20 minutes off).  Starting tomorrow, you   Notify the [facility] if you have any of the following: redness, drainage, swelling or fever above 100°F.      MEDICATIONS  Continue to take all routine medications.  Our office may have instructed you to hold some medications. You may restart medications, including blood thinners.

## 2024-02-20 ENCOUNTER — TELEPHONE (OUTPATIENT)
Dept: PAIN MEDICINE | Facility: CLINIC | Age: 59
End: 2024-02-20

## 2024-02-20 NOTE — TELEPHONE ENCOUNTER
Pt called in with 90% improvement and pain level 1-2/10.    Pt has one little spot on the right side that she is getting pain in.    Pt did start PT this week

## 2024-02-26 RX ORDER — SERTRALINE HYDROCHLORIDE 25 MG/1
TABLET, FILM COATED ORAL
COMMUNITY
Start: 2024-02-07

## 2024-02-28 ENCOUNTER — OFFICE VISIT (OUTPATIENT)
Dept: PAIN MEDICINE | Facility: CLINIC | Age: 59
End: 2024-02-28
Payer: COMMERCIAL

## 2024-02-28 VITALS
HEART RATE: 76 BPM | RESPIRATION RATE: 18 BRPM | WEIGHT: 207 LBS | HEIGHT: 65 IN | SYSTOLIC BLOOD PRESSURE: 122 MMHG | BODY MASS INDEX: 34.49 KG/M2 | OXYGEN SATURATION: 95 % | TEMPERATURE: 97.4 F | DIASTOLIC BLOOD PRESSURE: 82 MMHG

## 2024-02-28 DIAGNOSIS — M54.16 LUMBAR RADICULOPATHY: Primary | ICD-10-CM

## 2024-02-28 PROCEDURE — 99214 OFFICE O/P EST MOD 30 MIN: CPT | Performed by: ANESTHESIOLOGY

## 2024-02-28 NOTE — PROGRESS NOTES
Assessment  1. Lumbar radiculopathy - Bilateral    Greater than 80% relief of pain with improved ability to participate with IADLs after bilateral L3 TFESI. Prior injection at L5-S1 helped for approximately 4 months. Previously described symptoms more consistent with spinal stenosis/neurogenic claudication from moderate to severe stenosis noted at L3-L4 on 3/23 MRI. Now endorses radicular pain in bilateral L3 dermatome accompanied by pain limited weakness, numbness and paresthesias. Describes shopping cart sign, back pain and stiffness with prolonged activity. Nearly 2 months relief with prior ILESI at L3-L4 performed in the past. Previously reported the following symptomatology:     Axial left low back pain described primarily by arthritic features.  Aching, nagging, indolent, stabbing, throbbing features in axial left low back with bilateral L5 and S1 radicular (left greater than right) components.  5/5 strength bilaterally, negative SLR.  Positive facet loading maneuvers in lumbar spine elicited pain, positive tenderness to palpation over lumbar paraspinal muscles. Positive iram's, gaenslen's, SIJ loading left sided as well as ritesh finger and compression test.  Prior L4-L5 fusion in 2016.  On MRI at L3-L4 moderate central canal stenosis with mild bilateral neuroforaminal stenosis, at L4-L5 grade 1 spondylolisthesis of L4 on L5 with postsurgical changes, posterior central disc herniation. At L5-S1 a left central disc herniation leading to mild left sided neural foraminal stenosis, and small synovial cyst abutting left S1 spinal nerve. Currently she is neurologically intact without gait instability, saddle anesthesia or bowel/bladder abnormality. Risks, benefits alternative to ILESI thoroughly discussed with patient.  Handouts provided questions answered to patient satisfaction. Will additionally obtain imaging to rule out hardware instability or new disc degeneration at L5-S1.    Plan  -f/u prn  -DXA spine  hip and pelvis showed osteopenia; will f/u with pcp for further recommendations  -lyrica 75mg TID ordered for patient; counseled regarding sedative effects of taking this medication and provided up titration calendar.  Counseled not to take medication while driving or operating heavy machinery/using stairs  -continue PT; Physician directed home exercise plan as per [employer] demonstrated and handouts provided that patient plans to participate with for 1 hour, twice a week for the next 6 weeks.     There are risks associated with opioid medications, including dependence, addiction and tolerance. The patient understands and agrees to use these medications only as prescribed. Potential side effects of the medications include, but are not limited to, constipation, drowsiness, addiction, impaired judgment and risk of fatal overdose if not taken as prescribed. The patient was warned against driving while taking sedation medications or operating heavy machinery. The patient voiced understanding. Sharing medications is a felony. At this point in time, the patient is showing no signs of addiction, abuse, diversion or suicidal ideation.     [place] Prescription Drug Monitoring Program report was reviewed and was appropriate      Complete risks and benefits including bleeding, infection, tissue reaction, nerve injury and allergic reaction were discussed. The approach was demonstrated using models and literature was provided. Verbal and written consent was obtained.     My impressions and treatment recommendations were discussed in detail with the patient who verbalized understanding and had no further questions.  Discharge instructions were provided. I personally saw and examined the patient and I agree with the above discussed plan of care.      New Medications Ordered This Visit   Medications    sertraline (ZOLOFT) 25 mg tablet     Sig: Take 1/2 tablet once daily x4 days, then 1 tablet daily       History of Present  Illness    Greater than 80% relief of pain with improved ability to participate with IADLs after bilateral L3 TFESI. Prior injection at L5-S1 helped for approximately 4 months. Previously described symptoms more consistent with spinal stenosis/neurogenic claudication from moderate to severe stenosis noted at L3-L4 on 3/23 MRI. Now endorses radicular pain in bilateral L3 dermatome accompanied by pain limited weakness, numbness and paresthesias. Describes shopping cart sign, back pain and stiffness with prolonged activity. Nearly 2 months relief with prior ILESI at L3-L4 performed in the past. Previously reported the following symptomatology:     June Edward is a 58 y.o. female with pmhx of factor V leiden deficiency on xarelto (DVT, PE in 2014, prior L4-L5 fusion in presenting with chronic left greater than right low back pain described primarily as arthritic in nature. She describes 8/10 low back pain that is worse in the mornings and worse at the end of the day.  The pain is characterized by achy, nagging, indolent, crampy, stabbing pain in her left sided axial low back.  The patient describes that the pain is worse with standing for long periods of time on hard surfaces as well as with walking.  The patient is a very active individual and feels as though this pain compromises her participation with independent activities of daily living. The pain can be debilitating at times and contribute to significant disability, compromising overall activity and independent activities of daily living.  She has tried physical therapy with limited relief of symptoms.  Medications the patient has tried in the past include nsaids, tylenol. She describes no radicular symptoms and has good strength. She endoreses pain limited weakness in bilateral L5 and S1 dermatomes accompanied by numbness and paresthesias.  The patient denies any bowel or bladder dysfunction as well, saddle anesthesia or gait instability.      I have personally  reviewed and/or updated the patient's past medical history, past surgical history, family history, social history, current medications, allergies, and vital signs today.     Review of Systems   Constitutional:  Positive for activity change.   HENT: Negative.     Eyes: Negative.    Respiratory: Negative.     Cardiovascular: Negative.    Gastrointestinal: Negative.    Endocrine: Negative.    Genitourinary: Negative.    Musculoskeletal:  Positive for arthralgias, back pain and myalgias. Negative for gait problem.   Skin: Negative.    Allergic/Immunologic: Negative.    Neurological:  Negative for weakness and numbness.   Hematological: Negative.    Psychiatric/Behavioral: Negative.     All other systems reviewed and are negative.      Patient Active Problem List   Diagnosis    Postlaminectomy syndrome    Lumbar spondylosis    Sacroiliitis (HCC)    Lumbar radiculopathy       Past Medical History:   Diagnosis Date    Back pain     DVT (deep vein thrombosis) in pregnancy     Factor 5 Leiden mutation, heterozygous (HCC)     High cholesterol     Pulmonary embolism (HCC)        Past Surgical History:   Procedure Laterality Date    BACK SURGERY      DILATION AND CURETTAGE OF UTERUS      ENDOMETRIAL ABLATION      EPIDURAL BLOCK INJECTION N/A 04/18/2023    Procedure: BLOCK / INJECTION EPIDURAL STEROID LUMBAR L5-S1;  Surgeon: Kp Barry MD;  Location: [facility];  Service: Pain Management     EPIDURAL BLOCK INJECTION N/A 09/28/2023    Procedure: BLOCK / INJECTION EPIDURAL STEROID LUMBAR L3-L4;  Surgeon: Kp Barry MD;  Location:  [facility];  Service: Pain Management     IR DVT THROMBOLYSIS/THROMBECTOMY LOWER EXTREMITY WITH VENOGRAM      IR SPINE AND PAIN PROCEDURE  2/13/2024    GA INJECT SI JOINT ARTHRGRPHY&/ANES/STEROID W/RYDER Left 03/16/2023    Procedure: BLOCK / INJECTION SACROILIAC;  Surgeon: Kp Barry MD;  Location:  [facility];  Service: Pain Management        History reviewed. No pertinent family  "history.    Social History     Occupational History    Not on file   Tobacco Use    Smoking status: Never    Smokeless tobacco: Never   Vaping Use    Vaping status: Some Days    Substances: THC   Substance and Sexual Activity    Alcohol use: Yes     Comment: social    Drug use: Yes     Types: Marijuana    Sexual activity: Not on file       Current Outpatient Medications on File Prior to Visit   Medication Sig    atorvastatin (LIPITOR) 20 mg tablet Take 20 mg by mouth    CALCIUM PO     Cholecalciferol (Vitamin D3) POWD     multivitamin (THERAGRAN) TABS Take 1 tablet by mouth daily    rivaroxaban (XARELTO) 10 mg tablet Take 1 tablet by mouth daily    sertraline (ZOLOFT) 25 mg tablet Take 1/2 tablet once daily x4 days, then 1 tablet daily    pregabalin (LYRICA) 75 mg capsule Take 1 capsule (75 mg total) by mouth 3 (three) times a day (Patient not taking: Reported on 2/28/2024)     No current facility-administered medications on file prior to visit.       Allergies   Allergen Reactions    Ampicillin Rash    Sulfa Antibiotics Rash       Physical Exam    /82 (BP Location: Left arm, Patient Position: Sitting, Cuff Size: Adult)   Pulse 76   Temp (!) 97.4 °F (36.3 °C)   Resp 18   Ht 5' 5\" (1.651 m)   Wt 93.9 kg (207 lb)   SpO2 95%   BMI 34.45 kg/m²     Constitutional: normal, well developed, well nourished, alert, in no distress and non-toxic and no overt pain behavior. and obese  Eyes: anicteric  HEENT: grossly intact  Neck: supple, symmetric, trachea midline and no masses   Pulmonary:even and unlabored  Cardiovascular:No edema or pitting edema present  Skin:Normal without rashes or lesions and well hydrated  Psychiatric:Mood and affect appropriate  Neurologic:Cranial Nerves II-XII grossly intact Sensation grossly intact; no clonus negative piper's. Reflexes 2+ and brisk. SLR negative bilaterally.  Musculoskeletal:normal gait. 5/5 strength bilaterally with AROM in lower extremities. Normal heel toe and tip " toe walking. Significant pain with lumbar facet loading bilaterally and with lateral spine rotation. No ttp over lumbar paraspinal muscles. Positive iram's test,  gaenslen's SIJ loading left sided bilaterally.    Imaging     On MRI at L3-L4 moderate central canal stenosis with mild bilateral neuroforaminal stenosis, at L4-L5 grade 1 spondylolisthesis of L4 on L5 with postsurgical changes, posterior central disc herniation. At L5-S1 a left central disc herniation leading to mild left sided neural foraminal stenosis, and small synovial cyst abutting left S1 spinal nerve.

## 2024-04-10 ENCOUNTER — OFFICE VISIT (OUTPATIENT)
Dept: PAIN MEDICINE | Facility: CLINIC | Age: 59
End: 2024-04-10
Payer: COMMERCIAL

## 2024-04-10 VITALS
OXYGEN SATURATION: 95 % | WEIGHT: 207 LBS | SYSTOLIC BLOOD PRESSURE: 126 MMHG | TEMPERATURE: 97.9 F | BODY MASS INDEX: 34.49 KG/M2 | DIASTOLIC BLOOD PRESSURE: 86 MMHG | RESPIRATION RATE: 18 BRPM | HEART RATE: 78 BPM | HEIGHT: 65 IN

## 2024-04-10 DIAGNOSIS — M48.062 SPINAL STENOSIS OF LUMBAR REGION WITH NEUROGENIC CLAUDICATION: Primary | ICD-10-CM

## 2024-04-10 PROCEDURE — 99214 OFFICE O/P EST MOD 30 MIN: CPT | Performed by: ANESTHESIOLOGY

## 2024-04-10 NOTE — PROGRESS NOTES
Assessment  1. Spinal stenosis of lumbar region with neurogenic claudication  -     Ambulatory referral to Orthopedic Surgery; Future  -     MRI lumbar spine wo contrast; Future; Expected date: 04/10/2024    Greater than 80% relief of pain with improved ability to participate with IADLs after bilateral L3 TFESI which only helped for a month. Continues to describe symptoms more consistent with spinal stenosis/neurogenic claudication from moderate to severe stenosis noted at L3-L4 on 3/23 MRI. Endorses radicular pain in bilateral L3 dermatome accompanied by pain limited weakness, numbness and paresthesias. Describes shopping cart sign, back pain and stiffness with prolonged activity. Nearly 2 months relief with prior ILESI at L3-L4 performed in the past. Previously reported the following symptomatology:     Axial left low back pain described primarily by arthritic features.  Aching, nagging, indolent, stabbing, throbbing features in axial left low back with bilateral L5 and S1 radicular (left greater than right) components.  5/5 strength bilaterally, negative SLR.  Positive facet loading maneuvers in lumbar spine elicited pain, positive tenderness to palpation over lumbar paraspinal muscles. Positive iram's, gaenslen's, SIJ loading left sided as well as ritesh finger and compression test.  Prior L4-L5 fusion in 2016.  On MRI at L3-L4 moderate central canal stenosis with mild bilateral neuroforaminal stenosis, at L4-L5 grade 1 spondylolisthesis of L4 on L5 with postsurgical changes, posterior central disc herniation. At L5-S1 a left central disc herniation leading to mild left sided neural foraminal stenosis, and small synovial cyst abutting left S1 spinal nerve. Currently she is neurologically intact without gait instability, saddle anesthesia or bowel/bladder abnormality. Risks, benefits alternative to ILESI thoroughly discussed with patient.  Handouts provided questions answered to patient satisfaction. Will  additionally obtain imaging to rule out hardware instability or new disc degeneration at L5-S1.    Plan  -MRI lumbar spine; will f/u result  -Referral to Ortho spine at [facility] (patient's prior surgeon Dr. Redman) for possible repeat intervention  -DXA spine hip and pelvis showed osteopenia; will f/u with pcp for further recommendations  -lyrica 75mg TID ordered for patient; has since tapered. counseled regarding sedative effects of taking this medication and provided up titration calendar.  Counseled not to take medication while driving or operating heavy machinery/using stairs  -continue PT; Physician directed home exercise plan as per [employer] demonstrated and handouts provided that patient plans to participate with for 1 hour, twice a week for the next 6 weeks.     There are risks associated with opioid medications, including dependence, addiction and tolerance. The patient understands and agrees to use these medications only as prescribed. Potential side effects of the medications include, but are not limited to, constipation, drowsiness, addiction, impaired judgment and risk of fatal overdose if not taken as prescribed. The patient was warned against driving while taking sedation medications or operating heavy machinery. The patient voiced understanding. Sharing medications is a felony. At this point in time, the patient is showing no signs of addiction, abuse, diversion or suicidal ideation.     [place] Prescription Drug Monitoring Program report was reviewed and was appropriate      Complete risks and benefits including bleeding, infection, tissue reaction, nerve injury and allergic reaction were discussed. The approach was demonstrated using models and literature was provided. Verbal and written consent was obtained.     My impressions and treatment recommendations were discussed in detail with the patient who verbalized understanding and had no further questions.  Discharge instructions were provided. I  personally saw and examined the patient and I agree with the above discussed plan of care.      No orders of the defined types were placed in this encounter.      History of Present Illness    Greater than 80% relief of pain with improved ability to participate with IADLs after bilateral L3 TFESI which only helped for a month. Continues to describe symptoms more consistent with spinal stenosis/neurogenic claudication from moderate to severe stenosis noted at L3-L4 on 3/23 MRI. Endorses radicular pain in bilateral L3 dermatome accompanied by pain limited weakness, numbness and paresthesias. Describes shopping cart sign, back pain and stiffness with prolonged activity. Nearly 2 months relief with prior ILESI at L3-L4 performed in the past. Previously reported the following symptomatology:     June Edward is a 58 y.o. female with pmhx of factor V leiden deficiency on xarelto (DVT, PE in 2014, prior L4-L5 fusion in presenting with chronic left greater than right low back pain described primarily as arthritic in nature. She describes 8/10 low back pain that is worse in the mornings and worse at the end of the day.  The pain is characterized by achy, nagging, indolent, crampy, stabbing pain in her left sided axial low back.  The patient describes that the pain is worse with standing for long periods of time on hard surfaces as well as with walking.  The patient is a very active individual and feels as though this pain compromises her participation with independent activities of daily living. The pain can be debilitating at times and contribute to significant disability, compromising overall activity and independent activities of daily living.  She has tried physical therapy with limited relief of symptoms.  Medications the patient has tried in the past include nsaids, tylenol. She describes no radicular symptoms and has good strength. She endoreses pain limited weakness in bilateral L5 and S1 dermatomes accompanied by  numbness and paresthesias.  The patient denies any bowel or bladder dysfunction as well, saddle anesthesia or gait instability.      I have personally reviewed and/or updated the patient's past medical history, past surgical history, family history, social history, current medications, allergies, and vital signs today.     Review of Systems   Constitutional:  Positive for activity change.   HENT: Negative.     Eyes: Negative.    Respiratory: Negative.     Cardiovascular: Negative.    Gastrointestinal: Negative.    Endocrine: Negative.    Genitourinary: Negative.    Musculoskeletal:  Positive for arthralgias, back pain and myalgias. Negative for gait problem.   Skin: Negative.    Allergic/Immunologic: Negative.    Neurological:  Negative for weakness and numbness.   Hematological: Negative.    Psychiatric/Behavioral: Negative.     All other systems reviewed and are negative.      Patient Active Problem List   Diagnosis    Postlaminectomy syndrome    Lumbar spondylosis    Sacroiliitis (HCC)    Lumbar radiculopathy       Past Medical History:   Diagnosis Date    Back pain     DVT (deep vein thrombosis) in pregnancy     Factor 5 Leiden mutation, heterozygous (HCC)     High cholesterol     Pulmonary embolism (HCC)        Past Surgical History:   Procedure Laterality Date    BACK SURGERY      DILATION AND CURETTAGE OF UTERUS      ENDOMETRIAL ABLATION      EPIDURAL BLOCK INJECTION N/A 04/18/2023    Procedure: BLOCK / INJECTION EPIDURAL STEROID LUMBAR L5-S1;  Surgeon: Kp Barry MD;  Location: [facility];  Service: Pain Management     EPIDURAL BLOCK INJECTION N/A 09/28/2023    Procedure: BLOCK / INJECTION EPIDURAL STEROID LUMBAR L3-L4;  Surgeon: Kp Barry MD;  Location:  [facility];  Service: Pain Management     IR DVT THROMBOLYSIS/THROMBECTOMY LOWER EXTREMITY WITH VENOGRAM      IR SPINE AND PAIN PROCEDURE  2/13/2024    MT INJECT SI JOINT ARTHRGRPHY&/ANES/STEROID W/RYDER Left 03/16/2023    Procedure: BLOCK / INJECTION  "SACROILIAC;  Surgeon: Kp Barry MD;  Location: [facility];  Service: Pain Management        History reviewed. No pertinent family history.    Social History     Occupational History    Not on file   Tobacco Use    Smoking status: Never    Smokeless tobacco: Never   Vaping Use    Vaping status: Some Days    Substances: THC   Substance and Sexual Activity    Alcohol use: Yes     Comment: social    Drug use: Yes     Types: Marijuana    Sexual activity: Not on file       Current Outpatient Medications on File Prior to Visit   Medication Sig    atorvastatin (LIPITOR) 20 mg tablet Take 20 mg by mouth    CALCIUM PO     Cholecalciferol (Vitamin D3) POWD     multivitamin (THERAGRAN) TABS Take 1 tablet by mouth daily    rivaroxaban (XARELTO) 10 mg tablet Take 1 tablet by mouth daily    sertraline (ZOLOFT) 25 mg tablet Take 1/2 tablet once daily x4 days, then 1 tablet daily    pregabalin (LYRICA) 75 mg capsule Take 1 capsule (75 mg total) by mouth 3 (three) times a day (Patient not taking: Reported on 2/28/2024)     No current facility-administered medications on file prior to visit.       Allergies   Allergen Reactions    Ampicillin Rash    Sulfa Antibiotics Rash       Physical Exam    /86 (BP Location: Left arm, Patient Position: Sitting, Cuff Size: Adult)   Pulse 78   Temp 97.9 °F (36.6 °C)   Resp 18   Ht 5' 5\" (1.651 m)   Wt 93.9 kg (207 lb)   SpO2 95%   BMI 34.45 kg/m²     Constitutional: normal, well developed, well nourished, alert, in no distress and non-toxic and no overt pain behavior. and obese  Eyes: anicteric  HEENT: grossly intact  Neck: supple, symmetric, trachea midline and no masses   Pulmonary:even and unlabored  Cardiovascular:No edema or pitting edema present  Skin:Normal without rashes or lesions and well hydrated  Psychiatric:Mood and affect appropriate  Neurologic:Cranial Nerves II-XII grossly intact Sensation grossly intact; no clonus negative piper's. Reflexes 2+ and brisk. SLR " negative bilaterally.  Musculoskeletal:normal gait. 5/5 strength bilaterally with AROM in lower extremities. Normal heel toe and tip toe walking. Significant pain with lumbar facet loading bilaterally and with lateral spine rotation. No ttp over lumbar paraspinal muscles. Positive iram's test,  gaenslen's SIJ loading left sided bilaterally.    Imaging     On MRI at L3-L4 moderate central canal stenosis with mild bilateral neuroforaminal stenosis, at L4-L5 grade 1 spondylolisthesis of L4 on L5 with postsurgical changes, posterior central disc herniation. At L5-S1 a left central disc herniation leading to mild left sided neural foraminal stenosis, and small synovial cyst abutting left S1 spinal nerve.

## 2024-04-17 ENCOUNTER — OFFICE VISIT (OUTPATIENT)
Dept: URGENT CARE | Facility: CLINIC | Age: 59
End: 2024-04-17
Payer: COMMERCIAL

## 2024-04-17 VITALS
SYSTOLIC BLOOD PRESSURE: 146 MMHG | HEART RATE: 92 BPM | WEIGHT: 203 LBS | DIASTOLIC BLOOD PRESSURE: 94 MMHG | HEIGHT: 65 IN | TEMPERATURE: 97.5 F | OXYGEN SATURATION: 97 % | RESPIRATION RATE: 18 BRPM | BODY MASS INDEX: 33.82 KG/M2

## 2024-04-17 DIAGNOSIS — H69.91 ACUTE DYSFUNCTION OF EUSTACHIAN TUBE, RIGHT: Primary | ICD-10-CM

## 2024-04-17 PROCEDURE — G0382 LEV 3 HOSP TYPE B ED VISIT: HCPCS | Performed by: PHYSICIAN ASSISTANT

## 2024-04-17 RX ORDER — FLUTICASONE PROPIONATE 50 MCG
2 SPRAY, SUSPENSION (ML) NASAL DAILY
Qty: 16 G | Refills: 0 | Status: SHIPPED | OUTPATIENT
Start: 2024-04-17

## 2024-04-17 RX ORDER — METHYLPREDNISOLONE 4 MG/1
TABLET ORAL
Qty: 21 EACH | Refills: 0 | Status: SHIPPED | OUTPATIENT
Start: 2024-04-17 | End: 2024-04-23

## 2024-04-17 NOTE — PATIENT INSTRUCTIONS
Eustachian Tube Dysfunction   AMBULATORY CARE:   Eustachian tube dysfunction (ETD)  is a condition that prevents your eustachian tubes from opening properly. It can also cause them to become blocked. Eustachian tubes connect your middle ear to the back of your nose and throat. These tubes open and allow air to flow in and out when you sneeze, swallow, or yawn.       Common signs and symptoms include the following:   Fullness or pressure in your ears    Muffled hearing, or a feeling you are hearing under water or have clogged ears    Pain in one or both ears    Ringing in your ears    Popping, crackling, or clicking feeling in your ears    Trouble keeping your balance    Call your doctor or otolaryngologist if:   Your symptoms do not improve or get worse.    You have a fever.    You have any hearing loss.    You have questions or concerns about your condition or care.    Treatment:  ETD may get better on its own without any treatment. If it continues, you may need any of the following:  Swallow, yawn, or chew gum  to help open your eustachian tubes. Your healthcare provider may also recommend you blow with your mouth shut and your nostrils pinched closed.    Air pressure devices  push air into your nose and eustachian tubes to help relieve air pressure in your ear.    Treatment for allergies  such as decongestants, antihistamines, and nasal steroids may improve ETD. They may help decrease swelling of the eustachian tubes.    A myringotomy  is surgery to make a hole in your eardrum. The hole relieves pressure and lets fluid drain from your ear. A pressure equalizing (PE) tube may be used to keep the hole open and to help drain fluid.         Tuboplasty  is a procedure to widen your eustachian tubes.    Follow up with your doctor or otolaryngologist as directed:  Write down your questions so you remember to ask them during your visits.  © Copyright [employer] 2023 Information is for End User's use only and may not be  sold, redistributed or otherwise used for commercial purposes.  The above information is an  only. It is not intended as medical advice for individual conditions or treatments. Talk to your doctor, nurse or pharmacist before following any medical regimen to see if it is safe and effective for you.

## 2024-04-17 NOTE — PROGRESS NOTES
[facility]        NAME: June Edward is a 58 y.o. female  : 1965    MRN: [id number]  DATE: 2024  TIME: 4:58 PM    Assessment and Plan   Acute dysfunction of Eustachian tube, right [H69.91]  1. Acute dysfunction of Eustachian tube, right  fluticasone (FLONASE) 50 mcg/act nasal spray    methylPREDNISolone 4 MG tablet therapy pack            Patient Instructions     Pt has what I suspect is acute ETD of the right ear as cause for her symptoms. I prescribed her Flonase and a MDP and discussed OTC decongestants. She may also take Tylenol PRN. Rec reevaluation if symptoms persist/worsen despite treatment.   Follow up with PCP in 3-5 days.  Proceed to  ER if symptoms worsen.    If tests have been performed at [facility], our office will contact you with results if changes need to be made to the care plan discussed with you at the visit.  [facility]    Chief Complaint     Chief Complaint   Patient presents with    Earache     Right ear pain and pressure x 3 days          History of Present Illness       Pt presents with acute right ear pain x 3 days, pressure/fullness. Denies tinnitus, vertigo, otorrhea, or other symptoms. She has had some issues with congestion. She does not have any symptoms in the left ear.         Review of Systems   Review of Systems   Constitutional: Negative.    HENT:  Positive for congestion and ear pain (right). Negative for ear discharge and tinnitus.    Respiratory: Negative.     Cardiovascular: Negative.    Gastrointestinal: Negative.    Genitourinary: Negative.    Neurological:  Negative for dizziness.         Current Medications       Current Outpatient Medications:     atorvastatin (LIPITOR) 20 mg tablet, Take 20 mg by mouth, Disp: , Rfl:     CALCIUM PO, , Disp: , Rfl:     Cholecalciferol (Vitamin D3) POWD, , Disp: , Rfl:     fluticasone (FLONASE) 50 mcg/act nasal spray, 2 sprays into each nostril daily, Disp: 16 g, Rfl:  0    methylPREDNISolone 4 MG tablet therapy pack, Use as directed on package, Disp: 21 each, Rfl: 0    multivitamin (THERAGRAN) TABS, Take 1 tablet by mouth daily, Disp: , Rfl:     rivaroxaban (XARELTO) 10 mg tablet, Take 1 tablet by mouth daily, Disp: , Rfl:     sertraline (ZOLOFT) 25 mg tablet, Take 1/2 tablet once daily x4 days, then 1 tablet daily, Disp: , Rfl:     Current Allergies     Allergies as of 04/17/2024 - Reviewed 04/17/2024   Allergen Reaction Noted    Ampicillin Rash 01/18/2013    Sulfa antibiotics Rash 01/18/2013            The following portions of the patient's history were reviewed and updated as appropriate: allergies, current medications, past family history, past medical history, past social history, past surgical history and problem list.     Past Medical History:   Diagnosis Date    Back pain     DVT (deep vein thrombosis) in pregnancy     Factor 5 Leiden mutation, heterozygous (HCC)     High cholesterol     Pulmonary embolism (HCC)        Past Surgical History:   Procedure Laterality Date    BACK SURGERY      DILATION AND CURETTAGE OF UTERUS      ENDOMETRIAL ABLATION      EPIDURAL BLOCK INJECTION N/A 04/18/2023    Procedure: BLOCK / INJECTION EPIDURAL STEROID LUMBAR L5-S1;  Surgeon: Kp Barry MD;  [facility];  Service: Pain Management     EPIDURAL BLOCK INJECTION N/A 09/28/2023    Procedure: BLOCK / INJECTION EPIDURAL STEROID LUMBAR L3-L4;  Surgeon: Kp Barry MD;  [facility];  Service: Pain Management     IR DVT THROMBOLYSIS/THROMBECTOMY LOWER EXTREMITY WITH VENOGRAM      IR SPINE AND PAIN PROCEDURE  2/13/2024    AR INJECT SI JOINT ARTHRGRPHY&/ANES/STEROID W/RYDER Left 03/16/2023    Procedure: BLOCK / INJECTION SACROILIAC;  Surgeon: Kp Barry MD;  Location: [facility];  Service: Pain Management        History reviewed. No pertinent family history.      Medications have been verified.        Objective   /94   Pulse 92   Temp 97.5 °F (36.4 °C)  "(Tympanic)   Resp 18   Ht 5' 5\" (1.651 m)   Wt 92.1 kg (203 lb)   SpO2 97%   BMI 33.78 kg/m²   No LMP recorded. Patient is postmenopausal.       Physical Exam     Physical Exam  Vitals reviewed.   Constitutional:       General: She is not in acute distress.     Appearance: She is well-developed.   HENT:      Right Ear: Ear canal and external ear normal.      Left Ear: Tympanic membrane, ear canal and external ear normal.      Ears:      Comments: Right TM dull     Nose: Mucosal edema (B/L boggy turbinates) and congestion present.      Mouth/Throat:      Mouth: Mucous membranes are moist.      Pharynx: Oropharynx is clear.   Musculoskeletal:      Cervical back: Neck supple.   Lymphadenopathy:      Cervical: No cervical adenopathy.   Neurological:      Mental Status: She is alert and oriented to person, place, and time.                   "

## 2024-04-19 ENCOUNTER — TELEPHONE (OUTPATIENT)
Age: 59
End: 2024-04-19

## 2024-04-19 DIAGNOSIS — M48.062 SPINAL STENOSIS OF LUMBAR REGION WITH NEUROGENIC CLAUDICATION: Primary | ICD-10-CM

## 2024-04-19 NOTE — TELEPHONE ENCOUNTER
Caller: patient    Doctor: Jhonny    Reason for call: MRI scheduled needs an auth    Call back#:

## 2024-04-23 ENCOUNTER — TELEPHONE (OUTPATIENT)
Dept: PAIN MEDICINE | Facility: CLINIC | Age: 59
End: 2024-04-23

## 2024-04-23 NOTE — TELEPHONE ENCOUNTER
S/w pt who is scheduled for MRI with contrast at [facility] in [place] on 5/1/24.      Attempted to contact  , LASHANDA advising referral faxed([id number]) and requested cb if not rec'd. Phone [phone]  Fax [phone]    Requested cb with NPI number and address of facility to be sent to Shiela Hwang for auth .

## 2024-04-23 NOTE — TELEPHONE ENCOUNTER
Caller: radiology    Doctor: danielle    Reason for call: [place] adv that pt would need a new referral and a new auth. Pt has had surgery with rods placed in last 10 yeard and needs to have an MRI with contrast for a better image.     Call back#: [phone]

## 2024-04-23 NOTE — TELEPHONE ENCOUNTER
[employer] called. Patient called to schedule her MRI lumbar spine w/o contrast  Pt is scheduled 5/1/24 Patient did have rods placed L4-L5 in 2017 and radiologist requires with contrast to differentiate the artifacts within 10 years of surgery. New MRI script needed with contrast and authorization needs to be changed.      [employer] phone # [phone]  Galilea is aware Dr. Barry not in office till tomorrow

## 2024-04-23 NOTE — TELEPHONE ENCOUNTER
Rec'd via teams message from phone room    npi # [id number]  [address]  [place] [address]      S/w Rupinder at [facility] and confirmed referral rec'd.

## 2024-04-23 NOTE — TELEPHONE ENCOUNTER
MD Anahi Driver now (11:50 AM)       Had recent labs, kidney function ok; will place order for mri lumbar spine with contrast

## 2024-05-03 NOTE — TELEPHONE ENCOUNTER
Caller: gamaliel Watkins     Doctor: Keanu    Reason for call: pt has an Mri scheduled for 5/7/24. Pt stated [employer] is giving her a hard time, stating they need Dr. Barry to call in, in order to have the authorization expedited and state that it is medically necessary for pt to have this MRI due to the pain the pt is in. Please Advise.      Call back#: [phone]    t # [phone]

## 2024-05-06 NOTE — TELEPHONE ENCOUNTER
Caller: Ashlyn Murillo     Doctor:      Reason for call: Insurance needs the notes from the  office visits done within the last 6 weeks . Post op review done within the past 6 months. Failure of treatment, they have no proof of xr which is needed prior to the MRI.       MRI is tomorrow needs this asap       Call back#: [phone]

## 2024-05-06 NOTE — TELEPHONE ENCOUNTER
Caller: Hannah ( for employees )    Doctor: Jhonny    Reason for call: Hannah was calling on behalf of the pt. Hannah stated there are addt'l requirements needed for pt to have the MRI with contrast. Pt was ordered an MRI without contrast and nothing addt'l was needed for the insurance to approve it. Pt is scheduled for MRI tomorrow at 5/7/24. Please Advise       Call back#: [phone]   Case # [id number]

## 2024-05-07 NOTE — TELEPHONE ENCOUNTER
Shiela Salazar, RNYesterday (9:21 AM)     JN  I've uploaded all the clinicals I could for this case, it's pending Medical director review.

## 2024-05-08 NOTE — TELEPHONE ENCOUNTER
Caller: [facility]    Doctor: Jhonny    Reason for call: Viky is calling for the MRI scripts (with & without contrast) to be faxed to [phone].  Pt is scheduled for 5/14/2024    Call back#: [phone]

## 2024-05-08 NOTE — TELEPHONE ENCOUNTER
There is also an option for peer to peer, if it needs to be with contrast    [facility]  The prior authorization request for this study has not been approved. You can schedule a peer to peer by calling [employer] [phone] Case# [id number] with the deadline date of 5/15. The insurance determined the following:    [x ] Does not meet Medical Necessity  [ ] No prior imaging  [ ] PT or conservative treatment incomplete  [ ] Missing Labs  [ ] Frequency    We cannot approve this request because:  A picture study taken with a dye is not needed in order to show your doctor all of the details they  need in order to treat you. A picture study without dye is sufficient    If you choose not to complete a peer to peer then please reply to this message to let us know. Please notify your patient and contact central scheduling by calling [phone] to cancel the appointment and cancel the order in Epic.

## 2024-05-08 NOTE — TELEPHONE ENCOUNTER
Caller: June     Doctor: Jhonny     Reason for call: [employer] stated any nurse can call [facility] [phone] and request a [employer] nurse and change CPT code to: back to without Contract   [id number]  Ask if we can update prior authorization info   Call back#: [phone]

## 2024-05-08 NOTE — TELEPHONE ENCOUNTER
Caller: June    Doctor: Jhonny    Reason for call: [employer] stated any nurse can call [facility] [phone] and request a [employer] nurse and change CPT code to: back to without Contract   [id number]  Ask if we can update prior authorization info   Call back#: [phone]

## 2024-05-08 NOTE — TELEPHONE ENCOUNTER
Message  Received: Today  Shiela Alcantara, RN  Caller: Unspecified (2 days ago,  8:55 AM)  I just read the letter, it looks like they denied the MRI with contrast and are suggesting going back to the mri without contrast.    We cannot approve this request because:  A picture study taken with a dye is not needed in order to show your doctor all of the details they  need in order to treat you. A picture study without dye is sufficient.    If you want to change it back to without contrast I can try calling to see if they can change the code.

## 2024-05-08 NOTE — TELEPHONE ENCOUNTER
PATRICIA  MRI with contrast approved as per below. Pt states she will be having it done at [facility]. Phone [phone] [address]

## 2024-05-17 ENCOUNTER — OFFICE VISIT (OUTPATIENT)
Dept: PAIN MEDICINE | Facility: CLINIC | Age: 59
End: 2024-05-17
Payer: COMMERCIAL

## 2024-05-17 ENCOUNTER — PREP FOR PROCEDURE (OUTPATIENT)
Dept: PAIN MEDICINE | Facility: CLINIC | Age: 59
End: 2024-05-17

## 2024-05-17 VITALS
WEIGHT: 203 LBS | BODY MASS INDEX: 33.82 KG/M2 | TEMPERATURE: 97.3 F | OXYGEN SATURATION: 94 % | DIASTOLIC BLOOD PRESSURE: 82 MMHG | HEART RATE: 104 BPM | SYSTOLIC BLOOD PRESSURE: 118 MMHG | HEIGHT: 65 IN | RESPIRATION RATE: 18 BRPM

## 2024-05-17 DIAGNOSIS — M47.816 LUMBAR SPONDYLOSIS: Primary | ICD-10-CM

## 2024-05-17 DIAGNOSIS — M54.16 LUMBAR RADICULOPATHY: Primary | ICD-10-CM

## 2024-05-17 PROCEDURE — 99214 OFFICE O/P EST MOD 30 MIN: CPT | Performed by: ANESTHESIOLOGY

## 2024-05-17 NOTE — H&P (VIEW-ONLY)
Assessment  1. Lumbar spondylosis    Greater than 80% relief of pain with improved ability to participate with IADLs after bilateral L3 TFESI which only helped for a month. Continues to describe symptoms more consistent with axial low back pain without radicular features; findings are consistent with lumbar facet arthropathy noted on recent MRI. Previously reported the following symptomatology:     Axial low back pain described primarily by arthritic features.  Aching, nagging, indolent, stabbing, throbbing features in axial low back without radicular features.  5/5 strength bilaterally, negative SLR.  Positive facet loading maneuvers in lumbar spine elicited pain, positive tenderness to palpation over lumbar paraspinal muscles.Prior L4-L5 fusion in 2016.  On MRI at L3-L4 moderate central canal stenosis with prominent lumbar spondylosis throughout lumbar spine. Currently she is neurologically intact without gait instability, saddle anesthesia or bowel/bladder abnormality. Risks, benefits alternative to MBBs/RFA thoroughly discussed with patient.  Handouts provided questions answered to patient satisfaction.   Plan  -bilateral L3, L4, L5 medial branch blocks #1; f/u 2 weeks post procedure  -DXA spine hip and pelvis showed osteopenia; will f/u with pcp for further recommendations  -lyrica 75mg TID ordered for patient; has since tapered. counseled regarding sedative effects of taking this medication and provided up titration calendar.  Counseled not to take medication while driving or operating heavy machinery/using stairs  -has completed formal PT; Physician directed home exercise plan as per [employer] demonstrated and handouts provided that patient plans to participate with for 1 hour, twice a week for the next 6 weeks.     There are risks associated with opioid medications, including dependence, addiction and tolerance. The patient understands and agrees to use these medications only as prescribed. Potential side  effects of the medications include, but are not limited to, constipation, drowsiness, addiction, impaired judgment and risk of fatal overdose if not taken as prescribed. The patient was warned against driving while taking sedation medications or operating heavy machinery. The patient voiced understanding. Sharing medications is a felony. At this point in time, the patient is showing no signs of addiction, abuse, diversion or suicidal ideation.     [place] Prescription Drug Monitoring Program report was reviewed and was appropriate      Complete risks and benefits including bleeding, infection, tissue reaction, nerve injury and allergic reaction were discussed. The approach was demonstrated using models and literature was provided. Verbal and written consent was obtained.     My impressions and treatment recommendations were discussed in detail with the patient who verbalized understanding and had no further questions.  Discharge instructions were provided. I personally saw and examined the patient and I agree with the above discussed plan of care.      No orders of the defined types were placed in this encounter.      History of Present Illness    Greater than 80% relief of pain with improved ability to participate with IADLs after bilateral L3 TFESI which only helped for a month. Continues to describe symptoms more consistent with axial low back pain without radicular features; findings are consistent with lumbar facet arthropathy noted on recent MRI. Previously reported the following symptomatology:     June Edward is a 58 y.o. female with pmhx of factor V leiden deficiency on xarelto (DVT, PE in 2014, prior L4-L5 fusion in presenting with chronic axial low back pain described primarily as arthritic in nature. She describes 8/10 low back pain that is worse in the mornings and worse at the end of the day.  The pain is characterized by achy, nagging, indolent, crampy, stabbing pain in her left sided axial low  back.  The patient describes that the pain is worse with standing for long periods of time on hard surfaces as well as with walking.  The patient is a very active individual and feels as though this pain compromises her participation with independent activities of daily living. The pain can be debilitating at times and contribute to significant disability, compromising overall activity and independent activities of daily living.  She has tried physical therapy with limited relief of symptoms.  Medications the patient has tried in the past include nsaids, tylenol. She describes no radicular symptoms and has good strength. The patient denies any bowel or bladder dysfunction as well, saddle anesthesia or gait instability.      I have personally reviewed and/or updated the patient's past medical history, past surgical history, family history, social history, current medications, allergies, and vital signs today.     Review of Systems   Constitutional:  Positive for activity change.   HENT: Negative.     Eyes: Negative.    Respiratory: Negative.     Cardiovascular: Negative.    Gastrointestinal: Negative.    Endocrine: Negative.    Genitourinary: Negative.    Musculoskeletal:  Positive for arthralgias, back pain and myalgias. Negative for gait problem.   Skin: Negative.    Allergic/Immunologic: Negative.    Neurological:  Negative for weakness and numbness.   Hematological: Negative.    Psychiatric/Behavioral: Negative.     All other systems reviewed and are negative.      Patient Active Problem List   Diagnosis    Postlaminectomy syndrome    Lumbar spondylosis    Sacroiliitis (HCC)    Lumbar radiculopathy       Past Medical History:   Diagnosis Date    Back pain     DVT (deep vein thrombosis) in pregnancy     Factor 5 Leiden mutation, heterozygous (HCC)     High cholesterol     Pulmonary embolism (HCC)        Past Surgical History:   Procedure Laterality Date    BACK SURGERY      DILATION AND CURETTAGE OF UTERUS       "ENDOMETRIAL ABLATION      EPIDURAL BLOCK INJECTION N/A 04/18/2023    Procedure: BLOCK / INJECTION EPIDURAL STEROID LUMBAR L5-S1;  Surgeon: Kp Barry MD;  [facility];  Service: Pain Management     EPIDURAL BLOCK INJECTION N/A 09/28/2023    Procedure: BLOCK / INJECTION EPIDURAL STEROID LUMBAR L3-L4;  Surgeon: Kp Barry MD;  [facility];  Service: Pain Management     IR DVT THROMBOLYSIS/THROMBECTOMY LOWER EXTREMITY WITH VENOGRAM      IR SPINE AND PAIN PROCEDURE  2/13/2024    RI INJECT SI JOINT ARTHRGRPHY&/ANES/STEROID W/RYDER Left 03/16/2023    Procedure: BLOCK / INJECTION SACROILIAC;  Surgeon: Kp Barry MD;  Location: [facility];  Service: Pain Management        History reviewed. No pertinent family history.    Social History     Occupational History    Not on file   Tobacco Use    Smoking status: Never    Smokeless tobacco: Never   Vaping Use    Vaping status: Some Days    Substances: THC   Substance and Sexual Activity    Alcohol use: Yes     Comment: social    Drug use: Yes     Types: Marijuana    Sexual activity: Not on file       Current Outpatient Medications on File Prior to Visit   Medication Sig    atorvastatin (LIPITOR) 20 mg tablet Take 20 mg by mouth    CALCIUM PO     Cholecalciferol (Vitamin D3) POWD     fluticasone (FLONASE) 50 mcg/act nasal spray 2 sprays into each nostril daily    multivitamin (THERAGRAN) TABS Take 1 tablet by mouth daily    rivaroxaban (XARELTO) 10 mg tablet Take 1 tablet by mouth daily    sertraline (ZOLOFT) 25 mg tablet Take 1/2 tablet once daily x4 days, then 1 tablet daily     No current facility-administered medications on file prior to visit.       Allergies   Allergen Reactions    Ampicillin Rash    Sulfa Antibiotics Rash       Physical Exam    /82 (BP Location: Left arm, Patient Position: Sitting, Cuff Size: Adult)   Pulse 104   Temp (!) 97.3 °F (36.3 °C)   Resp 18   Ht 5' 5\" (1.651 m)   Wt 92.1 kg (203 lb)   SpO2 94%   BMI 33.78 " kg/m²     Constitutional: normal, well developed, well nourished, alert, in no distress and non-toxic and no overt pain behavior. and obese  Eyes: anicteric  HEENT: grossly intact  Neck: supple, symmetric, trachea midline and no masses   Pulmonary:even and unlabored  Cardiovascular:No edema or pitting edema present  Skin:Normal without rashes or lesions and well hydrated  Psychiatric:Mood and affect appropriate  Neurologic:Cranial Nerves II-XII grossly intact Sensation grossly intact; no clonus negative piper's. Reflexes 2+ and brisk. SLR negative bilaterally.  Musculoskeletal:normal gait. 5/5 strength bilaterally with AROM in lower extremities. Difficulty with normal heel toe and tip toe walking. Significant pain with lumbar facet loading bilaterally and with lateral spine rotation. ttp over lumbar paraspinal muscles. Positive iram's test,  gaenslen's SIJ loading left sided bilaterally.    Imaging     On MRI at L3-L4 moderate central canal stenosis with mild bilateral neuroforaminal stenosis, at L4-L5 grade 1 spondylolisthesis of L4 on L5 with postsurgical changes, posterior central disc herniation. At L5-S1 a left central disc herniation leading to mild left sided neural foraminal stenosis, and small synovial cyst abutting left S1 spinal nerve.

## 2024-05-17 NOTE — PROGRESS NOTES
Assessment  1. Lumbar spondylosis    Greater than 80% relief of pain with improved ability to participate with IADLs after bilateral L3 TFESI which only helped for a month. Continues to describe symptoms more consistent with axial low back pain without radicular features; findings are consistent with lumbar facet arthropathy noted on recent MRI. Previously reported the following symptomatology:     Axial low back pain described primarily by arthritic features.  Aching, nagging, indolent, stabbing, throbbing features in axial low back without radicular features.  5/5 strength bilaterally, negative SLR.  Positive facet loading maneuvers in lumbar spine elicited pain, positive tenderness to palpation over lumbar paraspinal muscles.Prior L4-L5 fusion in 2016.  On MRI at L3-L4 moderate central canal stenosis with prominent lumbar spondylosis throughout lumbar spine. Currently she is neurologically intact without gait instability, saddle anesthesia or bowel/bladder abnormality. Risks, benefits alternative to MBBs/RFA thoroughly discussed with patient.  Handouts provided questions answered to patient satisfaction.   Plan  -bilateral L3, L4, L5 medial branch blocks #1; f/u 2 weeks post procedure  -DXA spine hip and pelvis showed osteopenia; will f/u with pcp for further recommendations  -lyrica 75mg TID ordered for patient; has since tapered. counseled regarding sedative effects of taking this medication and provided up titration calendar.  Counseled not to take medication while driving or operating heavy machinery/using stairs  -has completed formal PT; Physician directed home exercise plan as per [employer] demonstrated and handouts provided that patient plans to participate with for 1 hour, twice a week for the next 6 weeks.     There are risks associated with opioid medications, including dependence, addiction and tolerance. The patient understands and agrees to use these medications only as prescribed. Potential side  effects of the medications include, but are not limited to, constipation, drowsiness, addiction, impaired judgment and risk of fatal overdose if not taken as prescribed. The patient was warned against driving while taking sedation medications or operating heavy machinery. The patient voiced understanding. Sharing medications is a felony. At this point in time, the patient is showing no signs of addiction, abuse, diversion or suicidal ideation.     [place] Prescription Drug Monitoring Program report was reviewed and was appropriate      Complete risks and benefits including bleeding, infection, tissue reaction, nerve injury and allergic reaction were discussed. The approach was demonstrated using models and literature was provided. Verbal and written consent was obtained.     My impressions and treatment recommendations were discussed in detail with the patient who verbalized understanding and had no further questions.  Discharge instructions were provided. I personally saw and examined the patient and I agree with the above discussed plan of care.      No orders of the defined types were placed in this encounter.      History of Present Illness    Greater than 80% relief of pain with improved ability to participate with IADLs after bilateral L3 TFESI which only helped for a month. Continues to describe symptoms more consistent with axial low back pain without radicular features; findings are consistent with lumbar facet arthropathy noted on recent MRI. Previously reported the following symptomatology:     June Edward is a 58 y.o. female with pmhx of factor V leiden deficiency on xarelto (DVT, PE in 2014, prior L4-L5 fusion in presenting with chronic axial low back pain described primarily as arthritic in nature. She describes 8/10 low back pain that is worse in the mornings and worse at the end of the day.  The pain is characterized by achy, nagging, indolent, crampy, stabbing pain in her left sided axial low  back.  The patient describes that the pain is worse with standing for long periods of time on hard surfaces as well as with walking.  The patient is a very active individual and feels as though this pain compromises her participation with independent activities of daily living. The pain can be debilitating at times and contribute to significant disability, compromising overall activity and independent activities of daily living.  She has tried physical therapy with limited relief of symptoms.  Medications the patient has tried in the past include nsaids, tylenol. She describes no radicular symptoms and has good strength. The patient denies any bowel or bladder dysfunction as well, saddle anesthesia or gait instability.      I have personally reviewed and/or updated the patient's past medical history, past surgical history, family history, social history, current medications, allergies, and vital signs today.     Review of Systems   Constitutional:  Positive for activity change.   HENT: Negative.     Eyes: Negative.    Respiratory: Negative.     Cardiovascular: Negative.    Gastrointestinal: Negative.    Endocrine: Negative.    Genitourinary: Negative.    Musculoskeletal:  Positive for arthralgias, back pain and myalgias. Negative for gait problem.   Skin: Negative.    Allergic/Immunologic: Negative.    Neurological:  Negative for weakness and numbness.   Hematological: Negative.    Psychiatric/Behavioral: Negative.     All other systems reviewed and are negative.      Patient Active Problem List   Diagnosis    Postlaminectomy syndrome    Lumbar spondylosis    Sacroiliitis (HCC)    Lumbar radiculopathy       Past Medical History:   Diagnosis Date    Back pain     DVT (deep vein thrombosis) in pregnancy     Factor 5 Leiden mutation, heterozygous (HCC)     High cholesterol     Pulmonary embolism (HCC)        Past Surgical History:   Procedure Laterality Date    BACK SURGERY      DILATION AND CURETTAGE OF UTERUS       "ENDOMETRIAL ABLATION      EPIDURAL BLOCK INJECTION N/A 04/18/2023    Procedure: BLOCK / INJECTION EPIDURAL STEROID LUMBAR L5-S1;  Surgeon: Kp Barry MD;  Location: [facility];  Service: Pain Management     EPIDURAL BLOCK INJECTION N/A 09/28/2023    Procedure: BLOCK / INJECTION EPIDURAL STEROID LUMBAR L3-L4;  Surgeon: Kp Barry MD;  Location: [facility];  Service: Pain Management     IR DVT THROMBOLYSIS/THROMBECTOMY LOWER EXTREMITY WITH VENOGRAM      IR SPINE AND PAIN PROCEDURE  2/13/2024    DE INJECT SI JOINT ARTHRGRPHY&/ANES/STEROID W/RYDER Left 03/16/2023    Procedure: BLOCK / INJECTION SACROILIAC;  Surgeon: Kp Barry MD;  Location: [facility];  Service: Pain Management        History reviewed. No pertinent family history.    Social History     Occupational History    Not on file   Tobacco Use    Smoking status: Never    Smokeless tobacco: Never   Vaping Use    Vaping status: Some Days    Substances: THC   Substance and Sexual Activity    Alcohol use: Yes     Comment: social    Drug use: Yes     Types: Marijuana    Sexual activity: Not on file       Current Outpatient Medications on File Prior to Visit   Medication Sig    atorvastatin (LIPITOR) 20 mg tablet Take 20 mg by mouth    CALCIUM PO     Cholecalciferol (Vitamin D3) POWD     fluticasone (FLONASE) 50 mcg/act nasal spray 2 sprays into each nostril daily    multivitamin (THERAGRAN) TABS Take 1 tablet by mouth daily    rivaroxaban (XARELTO) 10 mg tablet Take 1 tablet by mouth daily    sertraline (ZOLOFT) 25 mg tablet Take 1/2 tablet once daily x4 days, then 1 tablet daily     No current facility-administered medications on file prior to visit.       Allergies   Allergen Reactions    Ampicillin Rash    Sulfa Antibiotics Rash       Physical Exam    /82 (BP Location: Left arm, Patient Position: Sitting, Cuff Size: Adult)   Pulse 104   Temp (!) 97.3 °F (36.3 °C)   Resp 18   Ht 5' 5\" (1.651 m)   Wt 92.1 kg (203 lb)   SpO2 94%   BMI 33.78 " kg/m²     Constitutional: normal, well developed, well nourished, alert, in no distress and non-toxic and no overt pain behavior. and obese  Eyes: anicteric  HEENT: grossly intact  Neck: supple, symmetric, trachea midline and no masses   Pulmonary:even and unlabored  Cardiovascular:No edema or pitting edema present  Skin:Normal without rashes or lesions and well hydrated  Psychiatric:Mood and affect appropriate  Neurologic:Cranial Nerves II-XII grossly intact Sensation grossly intact; no clonus negative piper's. Reflexes 2+ and brisk. SLR negative bilaterally.  Musculoskeletal:normal gait. 5/5 strength bilaterally with AROM in lower extremities. Difficulty with normal heel toe and tip toe walking. Significant pain with lumbar facet loading bilaterally and with lateral spine rotation. ttp over lumbar paraspinal muscles. Positive iram's test,  gaenslen's SIJ loading left sided bilaterally.    Imaging     On MRI at L3-L4 moderate central canal stenosis with mild bilateral neuroforaminal stenosis, at L4-L5 grade 1 spondylolisthesis of L4 on L5 with postsurgical changes, posterior central disc herniation. At L5-S1 a left central disc herniation leading to mild left sided neural foraminal stenosis, and small synovial cyst abutting left S1 spinal nerve.

## 2024-05-30 ENCOUNTER — HOSPITAL ENCOUNTER (OUTPATIENT)
Dept: GASTROENTEROLOGY | Facility: HOSPITAL | Age: 59
Setting detail: OUTPATIENT SURGERY
Discharge: HOME | End: 2024-05-30
Attending: ANESTHESIOLOGY | Admitting: ANESTHESIOLOGY
Payer: COMMERCIAL

## 2024-05-30 VITALS
OXYGEN SATURATION: 97 % | SYSTOLIC BLOOD PRESSURE: 114 MMHG | HEART RATE: 70 BPM | RESPIRATION RATE: 18 BRPM | TEMPERATURE: 97.4 F | DIASTOLIC BLOOD PRESSURE: 64 MMHG

## 2024-05-30 DIAGNOSIS — M54.16 LUMBAR RADICULOPATHY: ICD-10-CM

## 2024-05-30 PROCEDURE — 64493 INJ PARAVERT F JNT L/S 1 LEV: CPT | Performed by: ANESTHESIOLOGY

## 2024-05-30 PROCEDURE — 64494 INJ PARAVERT F JNT L/S 2 LEV: CPT | Performed by: ANESTHESIOLOGY

## 2024-05-30 RX ORDER — LIDOCAINE HYDROCHLORIDE 10 MG/ML
INJECTION, SOLUTION EPIDURAL; INFILTRATION; INTRACAUDAL; PERINEURAL AS NEEDED
Status: COMPLETED | OUTPATIENT
Start: 2024-05-30 | End: 2024-05-30

## 2024-05-30 RX ORDER — METHYLPREDNISOLONE ACETATE 80 MG/ML
INJECTION, SUSPENSION INTRA-ARTICULAR; INTRALESIONAL; INTRAMUSCULAR; SOFT TISSUE AS NEEDED
Status: COMPLETED | OUTPATIENT
Start: 2024-05-30 | End: 2024-05-30

## 2024-05-30 RX ORDER — BUPIVACAINE HYDROCHLORIDE 2.5 MG/ML
INJECTION, SOLUTION EPIDURAL; INFILTRATION; INTRACAUDAL AS NEEDED
Status: COMPLETED | OUTPATIENT
Start: 2024-05-30 | End: 2024-05-30

## 2024-05-30 RX ADMIN — LIDOCAINE HYDROCHLORIDE 10 ML: 10 INJECTION, SOLUTION EPIDURAL; INFILTRATION; INTRACAUDAL; PERINEURAL at 10:17

## 2024-05-30 RX ADMIN — METHYLPREDNISOLONE ACETATE 80 MG: 80 INJECTION, SUSPENSION INTRA-ARTICULAR; INTRALESIONAL; INTRAMUSCULAR; SOFT TISSUE at 10:17

## 2024-05-30 RX ADMIN — BUPIVACAINE HYDROCHLORIDE 6 ML: 2.5 INJECTION, SOLUTION EPIDURAL; INFILTRATION; INTRACAUDAL; PERINEURAL at 10:17

## 2024-05-30 NOTE — INTERVAL H&P NOTE
H&P reviewed. After examining the patient I find no changes in the patients condition since the H&P had been written.    Vitals:    05/30/24 0956   BP: 143/85   Pulse: 78   Resp: 20   Temp: 98.1 °F (36.7 °C)   SpO2: 98%

## 2024-05-30 NOTE — DISCHARGE INSTR - AVS FIRST PAGE
YOUR 2 WEEK FOLLOW UP HAS BEEN SCHEDULED; IF YOU WISH TO CHANGE THE FOLLOW UP, PLEASE CALL THE [facility] [place]: [phone]    MEDIAL BRANCH BLOCK DISCHARGE INSTRUCTIONS      ACTIVITY  Please do activities that will bring the normal pain that we are rating. For example, if vacuuming or walking increases the pain, then do them. This will give the most accurate response to the diary.  You may shower, but no tub baths today, or applied heat.    CARE OF THE INJECTION SITE  This area may be numb for several hours after the injection.  Notify the [facility] if you have any of the following: redness, drainage, swelling or fever above 100°F.    SPECIAL INSTRUCTIONS  Please return the MBB diary to our office by mail, fax, or drop it off.    MEDICATIONS  Please do not take any break through or short acting pain medications for 8 hours after the block.  Continue to take all routine medications.  Our office may have instructed you to hold some medications. You may resume these medications now.      If you have any problems specifically related to your procedure, please call our office at [phone]. Problems not related to your procedure should be directed at your primary care physician.    Lumbar Radiofrequency Ablation   WHAT YOU NEED TO KNOW:   What do I need to know about lumbar radiofrequency ablation?  Lumbar radiofrequency ablation (RFA) is a procedure used to treat facet joint pain in your lower back. Facet joints are found at the back of each vertebra. A needle electrode is used to send electrical currents to the nerves in your facet joint. The electrical currents create heat that damages the nerve so it cannot send pain signals.   How do I prepare for lumbar RFA?  Your healthcare provider will talk to you about how to prepare for this procedure. You may be told to not to eat or drink anything after midnight on the day of your procedure. Your provider will tell you what medicines  to take or not take on the day of your procedure.  What will happen during lumbar RFA?   You will lie on your stomach. You will be given local anesthesia to numb the area of your back where the needle electrode will be inserted. You may be given a sedative to help keep you relaxed. You may still feel pressure or pushing during the procedure, but you should not feel any pain. Your healthcare provider will use fluoroscopy (a type of x-ray) to guide the needle electrode to the nerves near your facet joint.     Your healthcare provider may touch the affected nerve to make sure the needle electrode is in the right place. You will feel tingling or pressure when your provider does this. Your provider will then apply local anesthesia to the nerve to numb it. This will prevent you from feeling pain when your provider applies heat to the nerve. Your provider will then apply heat to the nerve using the needle electrode. Your provider may need to apply heat to more than one nerve. Your provider will remove the needle electrode and apply a bandage over the area.    What are the risks of lumbar RFA?  You may have pain, numbness, tingling, or burning in the area where the lumbar RFA was done. These normally go away within 6 weeks. The needle electrode may injure your spinal nerves. This may cause permanent leg weakness or nerve pain.  CARE AGREEMENT:   You have the right to help plan your care. Learn about your health condition and how it may be treated. Discuss treatment options with your healthcare providers to decide what care you want to receive. You always have the right to refuse treatment. The above information is an  only. It is not intended as medical advice for individual conditions or treatments. Talk to your doctor, nurse or pharmacist before following any medical regimen to see if it is safe and effective for you.  © Copyright [employer] 2023 Information is for End User's use only and may not be sold,  redistributed or otherwise used for commercial purposes.

## 2024-06-12 ENCOUNTER — OFFICE VISIT (OUTPATIENT)
Dept: PAIN MEDICINE | Facility: CLINIC | Age: 59
End: 2024-06-12
Payer: COMMERCIAL

## 2024-06-12 VITALS
OXYGEN SATURATION: 94 % | DIASTOLIC BLOOD PRESSURE: 86 MMHG | BODY MASS INDEX: 33.82 KG/M2 | TEMPERATURE: 97.1 F | RESPIRATION RATE: 18 BRPM | HEART RATE: 78 BPM | HEIGHT: 65 IN | SYSTOLIC BLOOD PRESSURE: 138 MMHG | WEIGHT: 203 LBS

## 2024-06-12 DIAGNOSIS — M47.816 LUMBAR SPONDYLOSIS: Primary | ICD-10-CM

## 2024-06-12 PROCEDURE — 99214 OFFICE O/P EST MOD 30 MIN: CPT | Performed by: ANESTHESIOLOGY

## 2024-06-13 ENCOUNTER — PREP FOR PROCEDURE (OUTPATIENT)
Dept: PAIN MEDICINE | Facility: CLINIC | Age: 59
End: 2024-06-13

## 2024-06-13 DIAGNOSIS — M54.16 LUMBAR RADICULOPATHY: ICD-10-CM

## 2024-06-13 DIAGNOSIS — M47.816 LUMBAR SPONDYLOSIS: Primary | ICD-10-CM

## 2024-06-13 NOTE — PROGRESS NOTES
Assessment  1. Lumbar spondylosis    Greater than 80% relief of pain with improved ability to participate with IADLs after bilateral L3, L4, L5 medial branch blocks #1 for more than one week. Continues to describe symptoms more consistent with axial low back pain without radicular features; findings are consistent with lumbar facet arthropathy noted on recent MRI. Previously reported the following symptomatology:     Axial low back pain described primarily by arthritic features.  Aching, nagging, indolent, stabbing, throbbing features in axial low back without radicular features.  5/5 strength bilaterally, negative SLR.  Positive facet loading maneuvers in lumbar spine elicited pain, positive tenderness to palpation over lumbar paraspinal muscles.Prior L4-L5 fusion in 2016.  On MRI at L3-L4 moderate central canal stenosis with prominent lumbar spondylosis throughout lumbar spine. Currently she is neurologically intact without gait instability, saddle anesthesia or bowel/bladder abnormality. Risks, benefits alternative to MBBs/RFA thoroughly discussed with patient.  Handouts provided questions answered to patient satisfaction.   Plan  -bilateral L3, L4, L5 medial branch blocks #2; f/u 2 weeks post procedure  -DXA spine hip and pelvis showed osteopenia; will f/u with pcp for further recommendations  -lyrica 75mg TID ordered for patient; has since tapered. counseled regarding sedative effects of taking this medication and provided up titration calendar.  Counseled not to take medication while driving or operating heavy machinery/using stairs  -has completed formal PT; Physician directed home exercise plan as per [employer] demonstrated and handouts provided that patient plans to participate with for 1 hour, twice a week for the next 6 weeks.     Review of external notes including PT notes, PCP notes and specialist notes was performed at this visit in addition to review of new ordered imaging and past imaging to develop  or modify multidisciplinary pain plan      There are risks associated with opioid medications, including dependence, addiction and tolerance. The patient understands and agrees to use these medications only as prescribed. Potential side effects of the medications include, but are not limited to, constipation, drowsiness, addiction, impaired judgment and risk of fatal overdose if not taken as prescribed. The patient was warned against driving while taking sedation medications or operating heavy machinery. The patient voiced understanding. Sharing medications is a felony. At this point in time, the patient is showing no signs of addiction, abuse, diversion or suicidal ideation.     [place] Prescription Drug Monitoring Program report was reviewed and was appropriate      Complete risks and benefits including bleeding, infection, tissue reaction, nerve injury and allergic reaction were discussed. The approach was demonstrated using models and literature was provided. Verbal and written consent was obtained.     My impressions and treatment recommendations were discussed in detail with the patient who verbalized understanding and had no further questions.  Discharge instructions were provided. I personally saw and examined the patient and I agree with the above discussed plan of care.      No orders of the defined types were placed in this encounter.      History of Present Illness    Greater than 80% relief of pain with improved ability to participate with IADLs after bilateral L3, L4, L5 medial branch blocks #1 for more than one week. Continues to describe symptoms more consistent with axial low back pain without radicular features; findings are consistent with lumbar facet arthropathy noted on recent MRI. Previously reported the following symptomatology:     June Edward is a 58 y.o. female with pmhx of factor V leiden deficiency on xarelto (DVT, PE in 2014, prior L4-L5 fusion in presenting with chronic axial low  back pain described primarily as arthritic in nature. She describes 8/10 low back pain that is worse in the mornings and worse at the end of the day.  The pain is characterized by achy, nagging, indolent, crampy, stabbing pain in her left sided axial low back.  The patient describes that the pain is worse with standing for long periods of time on hard surfaces as well as with walking.  The patient is a very active individual and feels as though this pain compromises her participation with independent activities of daily living. The pain can be debilitating at times and contribute to significant disability, compromising overall activity and independent activities of daily living.  She has tried physical therapy with limited relief of symptoms.  Medications the patient has tried in the past include nsaids, tylenol. She describes no radicular symptoms and has good strength. The patient denies any bowel or bladder dysfunction as well, saddle anesthesia or gait instability.      I have personally reviewed and/or updated the patient's past medical history, past surgical history, family history, social history, current medications, allergies, and vital signs today.     Review of Systems   Constitutional:  Positive for activity change.   HENT: Negative.     Eyes: Negative.    Respiratory: Negative.     Cardiovascular: Negative.    Gastrointestinal: Negative.    Endocrine: Negative.    Genitourinary: Negative.    Musculoskeletal:  Positive for arthralgias, back pain and myalgias. Negative for gait problem.   Skin: Negative.    Allergic/Immunologic: Negative.    Neurological:  Negative for weakness and numbness.   Hematological: Negative.    Psychiatric/Behavioral: Negative.     All other systems reviewed and are negative.      Patient Active Problem List   Diagnosis    Postlaminectomy syndrome    Lumbar spondylosis    Sacroiliitis (HCC)    Lumbar radiculopathy       Past Medical History:   Diagnosis Date    Back pain     DVT  (deep vein thrombosis) in pregnancy     Factor 5 Leiden mutation, heterozygous (HCC)     High cholesterol     Pulmonary embolism (HCC)        Past Surgical History:   Procedure Laterality Date    BACK SURGERY      DILATION AND CURETTAGE OF UTERUS      ENDOMETRIAL ABLATION      EPIDURAL BLOCK INJECTION N/A 04/18/2023    Procedure: BLOCK / INJECTION EPIDURAL STEROID LUMBAR L5-S1;  Surgeon: Kp Barry MD;  Location: [facility];  Service: Pain Management     EPIDURAL BLOCK INJECTION N/A 09/28/2023    Procedure: BLOCK / INJECTION EPIDURAL STEROID LUMBAR L3-L4;  Surgeon: Kp Barry MD;  Location: [facility];  Service: Pain Management     IR DVT THROMBOLYSIS/THROMBECTOMY LOWER EXTREMITY WITH VENOGRAM      IR SPINE AND PAIN PROCEDURE  2/13/2024    IR SPINE AND PAIN PROCEDURE  5/30/2024    [place] INJECT SI JOINT ARTHRGRPHY&/ANES/STEROID W/RYDER Left 03/16/2023    Procedure: BLOCK / INJECTION SACROILIAC;  Surgeon: Kp Barry MD;  Location: [facility];  Service: Pain Management        History reviewed. No pertinent family history.    Social History     Occupational History    Not on file   Tobacco Use    Smoking status: Never    Smokeless tobacco: Never   Vaping Use    Vaping status: Some Days    Substances: THC   Substance and Sexual Activity    Alcohol use: Yes     Comment: social    Drug use: Yes     Types: Marijuana    Sexual activity: Not on file       Current Outpatient Medications on File Prior to Visit   Medication Sig    atorvastatin (LIPITOR) 20 mg tablet Take 20 mg by mouth    CALCIUM PO     Cholecalciferol (Vitamin D3) POWD     fluticasone (FLONASE) 50 mcg/act nasal spray 2 sprays into each nostril daily    multivitamin (THERAGRAN) TABS Take 1 tablet by mouth daily    rivaroxaban (XARELTO) 10 mg tablet Take 1 tablet by mouth daily    sertraline (ZOLOFT) 25 mg tablet Take 1/2 tablet once daily x4 days, then 1 tablet daily     No current facility-administered medications on file prior to visit.       Allergies  "  Allergen Reactions    Ampicillin Rash    Sulfa Antibiotics Rash       Physical Exam    /86 (BP Location: Left arm, Patient Position: Sitting, Cuff Size: Adult)   Pulse 78   Temp (!) 97.1 °F (36.2 °C)   Resp 18   Ht 5' 5\" (1.651 m)   Wt 92.1 kg (203 lb)   SpO2 94%   BMI 33.78 kg/m²     Constitutional: normal, well developed, well nourished, alert, in no distress and non-toxic and no overt pain behavior. and obese  Eyes: anicteric  HEENT: grossly intact  Neck: supple, symmetric, trachea midline and no masses   Pulmonary:even and unlabored  Cardiovascular:No edema or pitting edema present  Skin:Normal without rashes or lesions and well hydrated  Psychiatric:Mood and affect appropriate  Neurologic:Cranial Nerves II-XII grossly intact Sensation grossly intact; no clonus negative piper's. Reflexes 2+ and brisk. SLR negative bilaterally.  Musculoskeletal:normal gait. 5/5 strength bilaterally with AROM in lower extremities. Difficulty with normal heel toe and tip toe walking. Significant pain with lumbar facet loading bilaterally and with lateral spine rotation. ttp over lumbar paraspinal muscles. Positive iram's test,  gaenslen's SIJ loading left sided bilaterally.    Imaging     On MRI at L3-L4 moderate central canal stenosis with mild bilateral neuroforaminal stenosis, at L4-L5 grade 1 spondylolisthesis of L4 on L5 with postsurgical changes, posterior central disc herniation. At L5-S1 a left central disc herniation leading to mild left sided neural foraminal stenosis, and small synovial cyst abutting left S1 spinal nerve.    "

## 2024-06-13 NOTE — H&P (VIEW-ONLY)
Assessment  1. Lumbar spondylosis    Greater than 80% relief of pain with improved ability to participate with IADLs after bilateral L3, L4, L5 medial branch blocks #1 for more than one week. Continues to describe symptoms more consistent with axial low back pain without radicular features; findings are consistent with lumbar facet arthropathy noted on recent MRI. Previously reported the following symptomatology:     Axial low back pain described primarily by arthritic features.  Aching, nagging, indolent, stabbing, throbbing features in axial low back without radicular features.  5/5 strength bilaterally, negative SLR.  Positive facet loading maneuvers in lumbar spine elicited pain, positive tenderness to palpation over lumbar paraspinal muscles.Prior L4-L5 fusion in 2016.  On MRI at L3-L4 moderate central canal stenosis with prominent lumbar spondylosis throughout lumbar spine. Currently she is neurologically intact without gait instability, saddle anesthesia or bowel/bladder abnormality. Risks, benefits alternative to MBBs/RFA thoroughly discussed with patient.  Handouts provided questions answered to patient satisfaction.   Plan  -bilateral L3, L4, L5 medial branch blocks #2; f/u 2 weeks post procedure  -DXA spine hip and pelvis showed osteopenia; will f/u with pcp for further recommendations  -lyrica 75mg TID ordered for patient; has since tapered. counseled regarding sedative effects of taking this medication and provided up titration calendar.  Counseled not to take medication while driving or operating heavy machinery/using stairs  -has completed formal PT; Physician directed home exercise plan as per [employer] demonstrated and handouts provided that patient plans to participate with for 1 hour, twice a week for the next 6 weeks.     Review of external notes including PT notes, PCP notes and specialist notes was performed at this visit in addition to review of new ordered imaging and past imaging to develop  or modify multidisciplinary pain plan      There are risks associated with opioid medications, including dependence, addiction and tolerance. The patient understands and agrees to use these medications only as prescribed. Potential side effects of the medications include, but are not limited to, constipation, drowsiness, addiction, impaired judgment and risk of fatal overdose if not taken as prescribed. The patient was warned against driving while taking sedation medications or operating heavy machinery. The patient voiced understanding. Sharing medications is a felony. At this point in time, the patient is showing no signs of addiction, abuse, diversion or suicidal ideation.     [place] Prescription Drug Monitoring Program report was reviewed and was appropriate      Complete risks and benefits including bleeding, infection, tissue reaction, nerve injury and allergic reaction were discussed. The approach was demonstrated using models and literature was provided. Verbal and written consent was obtained.     My impressions and treatment recommendations were discussed in detail with the patient who verbalized understanding and had no further questions.  Discharge instructions were provided. I personally saw and examined the patient and I agree with the above discussed plan of care.      No orders of the defined types were placed in this encounter.      History of Present Illness    Greater than 80% relief of pain with improved ability to participate with IADLs after bilateral L3, L4, L5 medial branch blocks #1 for more than one week. Continues to describe symptoms more consistent with axial low back pain without radicular features; findings are consistent with lumbar facet arthropathy noted on recent MRI. Previously reported the following symptomatology:     June Edward is a 58 y.o. female with pmhx of factor V leiden deficiency on xarelto (DVT, PE in 2014, prior L4-L5 fusion in presenting with chronic axial low  back pain described primarily as arthritic in nature. She describes 8/10 low back pain that is worse in the mornings and worse at the end of the day.  The pain is characterized by achy, nagging, indolent, crampy, stabbing pain in her left sided axial low back.  The patient describes that the pain is worse with standing for long periods of time on hard surfaces as well as with walking.  The patient is a very active individual and feels as though this pain compromises her participation with independent activities of daily living. The pain can be debilitating at times and contribute to significant disability, compromising overall activity and independent activities of daily living.  She has tried physical therapy with limited relief of symptoms.  Medications the patient has tried in the past include nsaids, tylenol. She describes no radicular symptoms and has good strength. The patient denies any bowel or bladder dysfunction as well, saddle anesthesia or gait instability.      I have personally reviewed and/or updated the patient's past medical history, past surgical history, family history, social history, current medications, allergies, and vital signs today.     Review of Systems   Constitutional:  Positive for activity change.   HENT: Negative.     Eyes: Negative.    Respiratory: Negative.     Cardiovascular: Negative.    Gastrointestinal: Negative.    Endocrine: Negative.    Genitourinary: Negative.    Musculoskeletal:  Positive for arthralgias, back pain and myalgias. Negative for gait problem.   Skin: Negative.    Allergic/Immunologic: Negative.    Neurological:  Negative for weakness and numbness.   Hematological: Negative.    Psychiatric/Behavioral: Negative.     All other systems reviewed and are negative.      Patient Active Problem List   Diagnosis    Postlaminectomy syndrome    Lumbar spondylosis    Sacroiliitis (HCC)    Lumbar radiculopathy       Past Medical History:   Diagnosis Date    Back pain     DVT  (deep vein thrombosis) in pregnancy     Factor 5 Leiden mutation, heterozygous (HCC)     High cholesterol     Pulmonary embolism (HCC)        Past Surgical History:   Procedure Laterality Date    BACK SURGERY      DILATION AND CURETTAGE OF UTERUS      ENDOMETRIAL ABLATION      EPIDURAL BLOCK INJECTION N/A 04/18/2023    Procedure: BLOCK / INJECTION EPIDURAL STEROID LUMBAR L5-S1;  Surgeon: Kp Barry MD;  Location: [facility];  Service: Pain Management     EPIDURAL BLOCK INJECTION N/A 09/28/2023    Procedure: BLOCK / INJECTION EPIDURAL STEROID LUMBAR L3-L4;  Surgeon: Kp Barry MD;  Location: [facility];  Service: Pain Management     IR DVT THROMBOLYSIS/THROMBECTOMY LOWER EXTREMITY WITH VENOGRAM      IR SPINE AND PAIN PROCEDURE  2/13/2024    IR SPINE AND PAIN PROCEDURE  5/30/2024    AK INJECT SI JOINT ARTHRGRPHY&/ANES/STEROID W/RYDER Left 03/16/2023    Procedure: BLOCK / INJECTION SACROILIAC;  Surgeon: Kp Barry MD;  Location: [facility];  Service: Pain Management        History reviewed. No pertinent family history.    Social History     Occupational History    Not on file   Tobacco Use    Smoking status: Never    Smokeless tobacco: Never   Vaping Use    Vaping status: Some Days    Substances: THC   Substance and Sexual Activity    Alcohol use: Yes     Comment: social    Drug use: Yes     Types: Marijuana    Sexual activity: Not on file       Current Outpatient Medications on File Prior to Visit   Medication Sig    atorvastatin (LIPITOR) 20 mg tablet Take 20 mg by mouth    CALCIUM PO     Cholecalciferol (Vitamin D3) POWD     fluticasone (FLONASE) 50 mcg/act nasal spray 2 sprays into each nostril daily    multivitamin (THERAGRAN) TABS Take 1 tablet by mouth daily    rivaroxaban (XARELTO) 10 mg tablet Take 1 tablet by mouth daily    sertraline (ZOLOFT) 25 mg tablet Take 1/2 tablet once daily x4 days, then 1 tablet daily     No current facility-administered medications on file prior to visit.       Allergies  "  Allergen Reactions    Ampicillin Rash    Sulfa Antibiotics Rash       Physical Exam    /86 (BP Location: Left arm, Patient Position: Sitting, Cuff Size: Adult)   Pulse 78   Temp (!) 97.1 °F (36.2 °C)   Resp 18   Ht 5' 5\" (1.651 m)   Wt 92.1 kg (203 lb)   SpO2 94%   BMI 33.78 kg/m²     Constitutional: normal, well developed, well nourished, alert, in no distress and non-toxic and no overt pain behavior. and obese  Eyes: anicteric  HEENT: grossly intact  Neck: supple, symmetric, trachea midline and no masses   Pulmonary:even and unlabored  Cardiovascular:No edema or pitting edema present  Skin:Normal without rashes or lesions and well hydrated  Psychiatric:Mood and affect appropriate  Neurologic:Cranial Nerves II-XII grossly intact Sensation grossly intact; no clonus negative piper's. Reflexes 2+ and brisk. SLR negative bilaterally.  Musculoskeletal:normal gait. 5/5 strength bilaterally with AROM in lower extremities. Difficulty with normal heel toe and tip toe walking. Significant pain with lumbar facet loading bilaterally and with lateral spine rotation. ttp over lumbar paraspinal muscles. Positive iram's test,  gaenslen's SIJ loading left sided bilaterally.    Imaging     On MRI at L3-L4 moderate central canal stenosis with mild bilateral neuroforaminal stenosis, at L4-L5 grade 1 spondylolisthesis of L4 on L5 with postsurgical changes, posterior central disc herniation. At L5-S1 a left central disc herniation leading to mild left sided neural foraminal stenosis, and small synovial cyst abutting left S1 spinal nerve.    "

## 2024-06-25 ENCOUNTER — OFFICE VISIT (OUTPATIENT)
Dept: URGENT CARE | Facility: CLINIC | Age: 59
End: 2024-06-25
Payer: COMMERCIAL

## 2024-06-25 ENCOUNTER — APPOINTMENT (OUTPATIENT)
Dept: RADIOLOGY | Facility: CLINIC | Age: 59
End: 2024-06-25
Payer: COMMERCIAL

## 2024-06-25 VITALS
TEMPERATURE: 98.2 F | RESPIRATION RATE: 16 BRPM | OXYGEN SATURATION: 96 % | DIASTOLIC BLOOD PRESSURE: 90 MMHG | BODY MASS INDEX: 33.89 KG/M2 | WEIGHT: 203.4 LBS | HEIGHT: 65 IN | HEART RATE: 82 BPM | SYSTOLIC BLOOD PRESSURE: 142 MMHG

## 2024-06-25 DIAGNOSIS — S99.911A INJURY OF RIGHT ANKLE, INITIAL ENCOUNTER: ICD-10-CM

## 2024-06-25 DIAGNOSIS — S93.401A SPRAIN OF RIGHT ANKLE, UNSPECIFIED LIGAMENT, INITIAL ENCOUNTER: Primary | ICD-10-CM

## 2024-06-25 PROCEDURE — 73610 X-RAY EXAM OF ANKLE: CPT

## 2024-06-25 PROCEDURE — G0382 LEV 3 HOSP TYPE B ED VISIT: HCPCS

## 2024-06-25 NOTE — PROGRESS NOTES
[facility]        NAME: June Edward is a 58 y.o. female  : 1965    MRN: [id number]  DATE: 2024  TIME: 7:52 PM    Assessment and Plan   Sprain of right ankle, unspecified ligament, initial encounter [S93.401A]  1. Sprain of right ankle, unspecified ligament, initial encounter  XR ankle 3+ vw right    Ambulatory Referral to Orthopedic Surgery        Xray initial interpretation: no acute osseous abnormality  Official radiology read pending - We will only notify you if there needs to be a change in your treatment plan.   Ortho referral and supportive care reviewed    Patient Instructions     Xray initial interpretation:no acute osseous abnormality  Official radiology read pending - We will only notify you if there needs to be a change in your treatment plan.     Tylenol for pain  Wear splint for support (Remove braces and ACE bandages every 3 hours)  Ice 20 minutes 3-4 times per day for 3 days  Insulate the skin from the ice to prevent frostbite  Rest and Elevate  Follow up with orthopedic if symptoms do not improve - referral placed today    Follow up with PCP in 3-5 days.  Proceed to  ER if symptoms worsen.    If tests are performed, our office will contact you with results only if changes need to made to the care plan discussed with you at the visit. You can review your full results on [facility].    Chief Complaint     Chief Complaint   Patient presents with    Ankle Pain     Right ankle pain;  Fell about 3 months ago and twisted her ankle; has been wearing a brace for about 1 month; about 9 days ago it has started hurting worse again         History of Present Illness       58-year-old female arrives reporting she hurt her right ankle about 3 months ago when they were remodeling her house.  Patient reports that injury resolved spontaneously without any intervention.  Patient reports that over the past week she has had increasing pain to right ankle with flexion and extension  movements.  Patient reports she is wearing her old lace up ankle brace and it is giving her mild support.  Patient denies any pain in her calf.  Patient reports a history of factor V Leiden disease and has a history of DVT and PE.  Patient has not missed any doses of Xarelto.  Patient does not have any swelling of right lower extremity through calf area.     Ankle Pain         Review of Systems   Review of Systems   Constitutional:  Positive for activity change. Negative for chills and fever.   Musculoskeletal:  Positive for joint swelling.         Current Medications       Current Outpatient Medications:     atorvastatin (LIPITOR) 20 mg tablet, Take 20 mg by mouth, Disp: , Rfl:     CALCIUM PO, , Disp: , Rfl:     Cholecalciferol (Vitamin D3) POWD, , Disp: , Rfl:     fluticasone (FLONASE) 50 mcg/act nasal spray, 2 sprays into each nostril daily, Disp: 16 g, Rfl: 0    multivitamin (THERAGRAN) TABS, Take 1 tablet by mouth daily, Disp: , Rfl:     rivaroxaban (XARELTO) 10 mg tablet, Take 1 tablet by mouth daily, Disp: , Rfl:     sertraline (ZOLOFT) 25 mg tablet, Take 1/2 tablet once daily x4 days, then 1 tablet daily, Disp: , Rfl:     Current Allergies     Allergies as of 06/25/2024 - Reviewed 06/25/2024   Allergen Reaction Noted    Ampicillin Rash 01/18/2013    Sulfa antibiotics Rash 01/18/2013            The following portions of the patient's history were reviewed and updated as appropriate: allergies, current medications, past family history, past medical history, past social history, past surgical history and problem list.     Past Medical History:   Diagnosis Date    Back pain     DVT (deep vein thrombosis) in pregnancy     Factor 5 Leiden mutation, heterozygous (HCC)     High cholesterol     Pulmonary embolism (HCC)        Past Surgical History:   Procedure Laterality Date    BACK SURGERY      DILATION AND CURETTAGE OF UTERUS      ENDOMETRIAL ABLATION      EPIDURAL BLOCK INJECTION N/A 04/18/2023    Procedure:  "BLOCK / INJECTION EPIDURAL STEROID LUMBAR L5-S1;  Surgeon: Kp Barry MD;  Location: [facility];  Service: Pain Management     EPIDURAL BLOCK INJECTION N/A 09/28/2023    Procedure: BLOCK / INJECTION EPIDURAL STEROID LUMBAR L3-L4;  Surgeon: Kp Barry MD;  Location: [facility];  Service: Pain Management     IR DVT THROMBOLYSIS/THROMBECTOMY LOWER EXTREMITY WITH VENOGRAM      IR SPINE AND PAIN PROCEDURE  2/13/2024    IR SPINE AND PAIN PROCEDURE  5/30/2024    SC INJECT SI JOINT ARTHRGRPHY&/ANES/STEROID W/RYDER Left 03/16/2023    Procedure: BLOCK / INJECTION SACROILIAC;  Surgeon: Kp Barry MD;  Location: [facility];  Service: Pain Management        No family history on file.      Medications have been verified.        Objective   /90   Pulse 82   Temp 98.2 °F (36.8 °C)   Resp 16   Ht 5' 5\" (1.651 m)   Wt 92.3 kg (203 lb 6.4 oz)   SpO2 96%   BMI 33.85 kg/m²        Physical Exam     Physical Exam  Vitals and nursing note reviewed.   Constitutional:       General: She is not in acute distress.     Appearance: Normal appearance. She is not ill-appearing.   HENT:      Head: Normocephalic.      Right Ear: External ear normal.      Left Ear: External ear normal.      Nose: Nose normal.   Eyes:      Pupils: Pupils are equal, round, and reactive to light.   Cardiovascular:      Rate and Rhythm: Normal rate and regular rhythm.      Pulses: Normal pulses.      Heart sounds: Normal heart sounds.   Pulmonary:      Effort: Pulmonary effort is normal.      Breath sounds: Normal breath sounds.   Musculoskeletal:         General: Swelling, tenderness and signs of injury present. No deformity. Normal range of motion.      Cervical back: Normal range of motion and neck supple.      Right foot: Normal range of motion and normal capillary refill. Swelling and tenderness present. No deformity or bony tenderness. Normal pulse.   Lymphadenopathy:      Cervical: No cervical adenopathy.   Skin:     General: Skin is warm " and dry.      Capillary Refill: Capillary refill takes less than 2 seconds.   Neurological:      General: No focal deficit present.      Mental Status: She is alert and oriented to person, place, and time.   Psychiatric:         Mood and Affect: Mood normal.         Behavior: Behavior normal.

## 2024-06-26 NOTE — PATIENT INSTRUCTIONS
"Xray initial interpretation:no acute osseous abnormality  Official radiology read pending - We will only notify you if there needs to be a change in your treatment plan.     Tylenol for pain  Wear splint for support (Remove braces and ACE bandages every 3 hours)  Ice 20 minutes 3-4 times per day for 3 days  Insulate the skin from the ice to prevent frostbite  Rest and Elevate  Follow up with orthopedic if symptoms do not improve - referral placed today    Follow up with PCP in 3-5 days.  Proceed to  ER if symptoms worsen.    If tests are performed, our office will contact you with results only if changes need to made to the care plan discussed with you at the visit. You can review your full results on [facility].    Patient Education     Ankle sprain   The Basics   Written by the doctors and editors at [facility]   What happens when a person sprains their ankle? -- When a person sprains their ankle, the ankle joint turns too far in 1 direction.  Inside the ankle are tough bands of tissue called \"ligaments.\" These hold the different bones together. During a sprain, 1 or more of those ligaments stretch too far or even tear (figure 1). This can cause pain and swelling, make the ankle unsteady, and make it hard to put weight on the leg with the injured ankle.  What are the symptoms of an ankle sprain? -- The symptoms can include pain, tenderness, swelling, and bruising at the ankle. Some people with an ankle sprain also find it hard to move the foot in certain directions. Plus, some people cannot put weight on the leg with the injured ankle.  Is there a test for an ankle sprain? -- A doctor or nurse should be able to tell if you have a sprain by doing an exam and learning about what happened to your ankle. They might move your foot in different directions to see what hurts and to check how loose your ankle feels.  In some cases, a doctor might order an X-ray to check for broken bones, but that is not always " "needed. Some doctors might use an ultrasound to look at the ligaments. Ultrasound is an imaging test that creates pictures of the inside of the body.  Should I see a doctor or nurse? -- See your doctor or nurse if:   You cannot put weight on the leg with the injured ankle.   Your ankle looks deformed or crooked.   Your ankle is unstable (for example, it gives out while you are climbing stairs).  It's also best to see a doctor or nurse if you are not sure how serious an injury is.  How is an ankle sprain treated? -- Treatment for a sprained ankle is easy to remember if you think of the word \"PRICE\":   Protect - To avoid making your injury worse, you can wrap it with an elastic bandage (figure 2). Depending on how bad your sprain is, you might also get a brace or splint.   Rest - To rest the ankle, you can use crutches and stay off of your feet. Avoid activities that cause pain.   Ice - Apply a cold gel pack, bag of ice, or bag of frozen vegetables on your ankle every 1 to 2 hours, for 15 minutes each time. Put a thin towel between the ice (or other cold object) and your skin. Use the ice (or other cold object) for at least 6 hours after your injury. Some people find it helpful to ice longer, even up to 2 days after their injury.   Compression - \"Compression\" basically means pressure. You want to have your ankle under slight pressure by having it wrapped in an elastic bandage. This helps reduce swelling and supports the ankle. Your doctor or nurse will show you how to wrap your ankle. Be careful not to wrap it too tight, as this could cut off the blood flow to your foot.   Elevation - \"Elevation\" means keeping your foot raised up above the level of your heart. To do this, you can put your foot on some pillows or blankets while you are lying down, or on a table or chair while you are sitting.  You can also take medicines to relieve pain, such as acetaminophen (sample brand name: Tylenol), ibuprofen (sample brand names: " Advil, Motrin), or naproxen (sample brand name: Aleve).  People who have a mild sprain do not usually need to use a splint to keep their foot and ankle still. But people who have a more severe sprain sometimes do.  In rare cases, doctors suggest surgery to repair a torn ligament caused by an ankle sprain.  Can I do anything else to help my recovery? -- Most people heal more quickly if they do certain exercises. Usually, gentle exercises can be started after a few days, once swelling and pain have improved. The right exercises for you depend on what kind of sprain you have and how serious it is. Ask your doctor which exercises you should do. In some cases, they might recommend working with a physical therapist (exercise expert).  Over time, slowly build up the activities you do with your foot and ankle. It might be easier to do some activities if you wear a brace or splint on your ankle as it heals.  When should I call the doctor? -- Call for advice if:   Your pain or swelling is getting worse.   Your toes are blue or gray, and numb.   Your ankle feels more unstable or wobbly.   You have new or worsening symptoms.  All topics are updated as new evidence becomes available and our peer review process is complete.  This topic retrieved from [employer] on: Feb 26, 2024.  Topic 45568 Version 11.0  Release: 32.2.4 - C32.56  © 2024 UpToDate, Inc. and/or its affiliates. All rights reserved.  figure 1: Ankle sprains     When a person sprains their ankle, the ankle joint turns too far in a particular direction. Inside the ankle are tough bands of tissue called ligaments, which hold the different bones together. During a sprain, 1 or more of those ligaments (shown in white) stretch too far or even tear.  Graphic 43101 Version 2.0  figure 2: How to wrap your ankle with an elastic bandage     To wrap your ankle with an elastic bandage:  Start with the rolled bandage at the top of your foot below your toes. Wrap toward the inside of  your ankle.  Loop bandage around your foot and bring it up toward your ankle.  Loop bandage around the back of your ankle and bring it down to the opposite side of your foot.  Loop bandage under your foot again and repeat the process until the roll is done. Secure.  Graphic 186872 Version 2.0  Consumer Information Use and Disclaimer   Disclaimer: This generalized information is a limited summary of diagnosis, treatment, and/or medication information. It is not meant to be comprehensive and should be used as a tool to help the user understand and/or assess potential diagnostic and treatment options. It does NOT include all information about conditions, treatments, medications, side effects, or risks that may apply to a specific patient. It is not intended to be medical advice or a substitute for the medical advice, diagnosis, or treatment of a health care provider based on the health care provider's examination and assessment of a patient's specific and unique circumstances. Patients must speak with a health care provider for complete information about their health, medical questions, and treatment options, including any risks or benefits regarding use of medications. This information does not endorse any treatments or medications as safe, effective, or approved for treating a specific patient. UpToDate, Inc. and its affiliates disclaim any warranty or liability relating to this information or the use thereof.The use of this information is governed by the Terms of Use, available at https://www.woltersimgScrimmageuwer.com/en/know/clinical-effectiveness-terms. 2024© UpToDate, Inc. and its affiliates and/or licensors. All rights reserved.  Copyright   © 2024 UpToDate, Inc. and/or its affiliates. All rights reserved.

## 2024-07-02 ENCOUNTER — HOSPITAL ENCOUNTER (OUTPATIENT)
Dept: INTERVENTIONAL RADIOLOGY/VASCULAR | Facility: HOSPITAL | Age: 59
Discharge: HOME | End: 2024-07-02
Attending: ANESTHESIOLOGY | Admitting: ANESTHESIOLOGY
Payer: COMMERCIAL

## 2024-07-02 ENCOUNTER — OFFICE VISIT (OUTPATIENT)
Dept: OBGYN CLINIC | Facility: CLINIC | Age: 59
End: 2024-07-02
Payer: COMMERCIAL

## 2024-07-02 VITALS
TEMPERATURE: 98.7 F | SYSTOLIC BLOOD PRESSURE: 122 MMHG | RESPIRATION RATE: 18 BRPM | DIASTOLIC BLOOD PRESSURE: 91 MMHG | HEART RATE: 76 BPM | OXYGEN SATURATION: 98 %

## 2024-07-02 VITALS
HEIGHT: 65 IN | SYSTOLIC BLOOD PRESSURE: 122 MMHG | WEIGHT: 200 LBS | OXYGEN SATURATION: 98 % | BODY MASS INDEX: 33.32 KG/M2 | HEART RATE: 96 BPM | TEMPERATURE: 98.1 F | DIASTOLIC BLOOD PRESSURE: 80 MMHG

## 2024-07-02 DIAGNOSIS — G89.29 CHRONIC PAIN OF RIGHT ANKLE: Primary | ICD-10-CM

## 2024-07-02 DIAGNOSIS — M25.571 CHRONIC PAIN OF RIGHT ANKLE: Primary | ICD-10-CM

## 2024-07-02 DIAGNOSIS — S93.401A SPRAIN OF RIGHT ANKLE, UNSPECIFIED LIGAMENT, INITIAL ENCOUNTER: ICD-10-CM

## 2024-07-02 DIAGNOSIS — M54.16 LUMBAR RADICULOPATHY: ICD-10-CM

## 2024-07-02 DIAGNOSIS — M47.816 LUMBAR SPONDYLOSIS: ICD-10-CM

## 2024-07-02 PROCEDURE — 64494 INJ PARAVERT F JNT L/S 2 LEV: CPT | Performed by: ANESTHESIOLOGY

## 2024-07-02 PROCEDURE — 64493 INJ PARAVERT F JNT L/S 1 LEV: CPT | Performed by: ANESTHESIOLOGY

## 2024-07-02 PROCEDURE — 99204 OFFICE O/P NEW MOD 45 MIN: CPT | Performed by: STUDENT IN AN ORGANIZED HEALTH CARE EDUCATION/TRAINING PROGRAM

## 2024-07-02 RX ORDER — BUPIVACAINE HCL/PF 2.5 MG/ML
VIAL (ML) INJECTION AS NEEDED
Status: COMPLETED | OUTPATIENT
Start: 2024-07-02 | End: 2024-07-02

## 2024-07-02 RX ORDER — LIDOCAINE HYDROCHLORIDE 10 MG/ML
INJECTION, SOLUTION EPIDURAL; INFILTRATION; INTRACAUDAL; PERINEURAL AS NEEDED
Status: COMPLETED | OUTPATIENT
Start: 2024-07-02 | End: 2024-07-02

## 2024-07-02 RX ORDER — METHYLPREDNISOLONE ACETATE 80 MG/ML
INJECTION, SUSPENSION INTRA-ARTICULAR; INTRALESIONAL; INTRAMUSCULAR; PARENTERAL; SOFT TISSUE AS NEEDED
Status: COMPLETED | OUTPATIENT
Start: 2024-07-02 | End: 2024-07-02

## 2024-07-02 RX ADMIN — LIDOCAINE HYDROCHLORIDE 10 ML: 10 INJECTION, SOLUTION EPIDURAL; INFILTRATION; INTRACAUDAL; PERINEURAL at 08:56

## 2024-07-02 RX ADMIN — METHYLPREDNISOLONE ACETATE 80 MG: 80 INJECTION, SUSPENSION INTRA-ARTICULAR; INTRALESIONAL; INTRAMUSCULAR; SOFT TISSUE at 08:59

## 2024-07-02 RX ADMIN — BUPIVACAINE HYDROCHLORIDE 5 ML: 2.5 INJECTION, SOLUTION EPIDURAL; INFILTRATION; INTRACAUDAL; PERINEURAL at 08:59

## 2024-07-02 NOTE — DISCHARGE INSTR - AVS FIRST PAGE
YOUR 2 WEEK FOLLOW UP HAS BEEN SCHEDULED; IF YOU WISH TO CHANGE THE FOLLOW UP, PLEASE CALL THE [facility] AT [place]: [phone]    MEDIAL BRANCH BLOCK DISCHARGE INSTRUCTIONS      ACTIVITY  Please do activities that will bring the normal pain that we are rating. For example, if vacuuming or walking increases the pain, then do them. This will give the most accurate response to the diary.  You may shower, but no tub baths today, or applied heat.    CARE OF THE INJECTION SITE  This area may be numb for several hours after the injection.  Notify the [facility] if you have any of the following: redness, drainage, swelling or fever above 100°F.    SPECIAL INSTRUCTIONS  Please return the MBB diary to our office by mail, fax, or drop it off.    MEDICATIONS  Please do not take any break through or short acting pain medications for 8 hours after the block.  Continue to take all routine medications.  Our office may have instructed you to hold some medications. You may resume these medications now.      If you have any problems specifically related to your procedure, please call our office at [phone]. Problems not related to your procedure should be directed at your primary care physician.    Lumbar Radiofrequency Ablation   WHAT YOU NEED TO KNOW:   What do I need to know about lumbar radiofrequency ablation?  Lumbar radiofrequency ablation (RFA) is a procedure used to treat facet joint pain in your lower back. Facet joints are found at the back of each vertebra. A needle electrode is used to send electrical currents to the nerves in your facet joint. The electrical currents create heat that damages the nerve so it cannot send pain signals.   How do I prepare for lumbar RFA?  Your healthcare provider will talk to you about how to prepare for this procedure. You may be told to not to eat or drink anything after midnight on the day of your procedure. Your provider will tell you what medicines  to take or not take on the day of your procedure.  What will happen during lumbar RFA?   You will lie on your stomach. You will be given local anesthesia to numb the area of your back where the needle electrode will be inserted. You may be given a sedative to help keep you relaxed. You may still feel pressure or pushing during the procedure, but you should not feel any pain. Your healthcare provider will use fluoroscopy (a type of x-ray) to guide the needle electrode to the nerves near your facet joint.     Your healthcare provider may touch the affected nerve to make sure the needle electrode is in the right place. You will feel tingling or pressure when your provider does this. Your provider will then apply local anesthesia to the nerve to numb it. This will prevent you from feeling pain when your provider applies heat to the nerve. Your provider will then apply heat to the nerve using the needle electrode. Your provider may need to apply heat to more than one nerve. Your provider will remove the needle electrode and apply a bandage over the area.    What are the risks of lumbar RFA?  You may have pain, numbness, tingling, or burning in the area where the lumbar RFA was done. These normally go away within 6 weeks. The needle electrode may injure your spinal nerves. This may cause permanent leg weakness or nerve pain.  CARE AGREEMENT:   You have the right to help plan your care. Learn about your health condition and how it may be treated. Discuss treatment options with your healthcare providers to decide what care you want to receive. You always have the right to refuse treatment. The above information is an  only. It is not intended as medical advice for individual conditions or treatments. Talk to your doctor, nurse or pharmacist before following any medical regimen to see if it is safe and effective for you.  © Copyright [employer] 2023 Information is for End User's use only and may not be sold,  redistributed or otherwise used for commercial purposes.

## 2024-07-02 NOTE — PROGRESS NOTES
1. Chronic pain of right ankle        2. Sprain of right ankle, unspecified ligament, initial encounter  Ambulatory Referral to Orthopedic Surgery        No orders of the defined types were placed in this encounter.       Imaging Studies (I personally reviewed images in PACS and report):    X-ray right ankle 6/25/2024: No acute osseous abnormalities.  No significant degenerative changes.  Mortise is symmetrical and intact.  Lateral ankle soft tissue swelling.    IMPRESSION:  Acute right lateral ankle pain  Reports h/o right ankle inversion injury in April, 2024 while remodeling home. Note swelling, bruising, and limping after event. Treated conservatively on her own with bracing, home stretching, icing, elevation which did facilitate relief but pain worsened again over past couple weeks  Radiographs unremarkable  Clinical history exam suggest continued pain from her prior suspected lateral ankle sprain of the ATFL/CFL.  Differential includes peroneal strain    Other factors:  Cannot take oral NSAIDs given she is on Xarelto  BMI 33    PLAN:    Clinical exam and radiographic imaging reviewed with patient today, with impression as per above. I have discussed with the patient the pathophysiology of this diagnosis and reviewed how the examination correlates with this diagnosis.    Prior imaging reviewed as noted above  Recommended conservative treatment at this time and starting formal physical therapy given her home treatments, bracing have not progressively improved her symptoms and due to the chronicity of this issue.  Patient prefers to try home exercise program first and these were supplemented today.  Counseled daily adherence.  Can continue as needed use of her ankle brace during activities until she feels her ankle pain has improved and she has full range of motion and function of her ankle.  Counseled the goals eventually transition out of the brace over time with further strengthening.  In regards to pain  "control I counseled as needed use of acetaminophen, topical NSAIDs, elevation of the affected extremity, icing/heat therapy 20 minutes on/off.  I advised against oral NSAIDs given her history of Xarelto use as this can precipitate internal bleeding.  If there is no improvement with these interventions after 4 to 6 weeks, we can consider obtaining an MRI of her right ankle without contrast for further evaluation.    Return in about 5 weeks (around 8/6/2024).    Portions of the record may have been created with voice recognition software. Occasional wrong word or \"sound a like\" substitutions may have occurred due to the inherent limitations of voice recognition software. Read the chart carefully and recognize, using context, where substitutions have occurred.     CHIEF COMPLAINT:  Chief Complaint   Patient presents with    Right Ankle - Pain         HPI:  June Edward is a 58 y.o. female  who presents for       Visit 7/2/2024 :  Initial evaluation of right ankle pain  Ongoing issue for the past week with injury. However, she notes sustaining a right ankle twisting injury 3 months ago while remodeling a house. Improved conservatively as she had an ankle brace to use and was icing/elevating and taking tylenol; cannot use nsaids d/t being on xarelto. Symptoms seem to resolve after 2 weeks. Did not seek out medical care from this injury given the improvement.  However, of the past couple weeks she's noticed increased pain with weightbearing over lateral ankle, swelling despite not sustaining a new injury she can recall.   Describes as an aching/sharp pain. Denies radiation of pain. Denies discoloration of ankle. Denies N/T of RLE. Reports sense of instability while walking for prolonged period of time. Pain improves with rest.            Medical, Surgical, Family, and Social History    Past Medical History:   Diagnosis Date    Back pain     DVT (deep vein thrombosis) in pregnancy     Factor 5 Leiden mutation, heterozygous " "(HCC)     High cholesterol     Pulmonary embolism (HCC)      Past Surgical History:   Procedure Laterality Date    BACK SURGERY      DILATION AND CURETTAGE OF UTERUS      ENDOMETRIAL ABLATION      EPIDURAL BLOCK INJECTION N/A 04/18/2023    Procedure: BLOCK / INJECTION EPIDURAL STEROID LUMBAR L5-S1;  Surgeon: Kp Barry MD;  Location: [facility];  Service: Pain Management     EPIDURAL BLOCK INJECTION N/A 09/28/2023    Procedure: BLOCK / INJECTION EPIDURAL STEROID LUMBAR L3-L4;  Surgeon: Kp Barry MD;  Location: [facility];  Service: Pain Management     IR DVT THROMBOLYSIS/THROMBECTOMY LOWER EXTREMITY WITH VENOGRAM      IR SPINE AND PAIN PROCEDURE  2/13/2024    IR SPINE AND PAIN PROCEDURE  5/30/2024    IR SPINE AND PAIN PROCEDURE  7/2/2024    MN INJECT SI JOINT ARTHRGRPHY&/ANES/STEROID W/RYDER Left 03/16/2023    Procedure: BLOCK / INJECTION SACROILIAC;  Surgeon: Kp Barry MD;  Location: [facility];  Service: Pain Management      Social History   Social History     Substance and Sexual Activity   Alcohol Use Yes    Comment: social     Social History     Substance and Sexual Activity   Drug Use Yes    Types: Marijuana     Social History     Tobacco Use   Smoking Status Never   Smokeless Tobacco Never     History reviewed. No pertinent family history.  Allergies   Allergen Reactions    Ampicillin Rash    Sulfa Antibiotics Rash          Physical Exam  /80   Pulse 96   Temp 98.1 °F (36.7 °C)   Ht 5' 5\" (1.651 m)   Wt 90.7 kg (200 lb)   SpO2 98%   BMI 33.28 kg/m²     Constitutional:  see vital signs  Gen: BMI 33, normocephalic/atraumatic, well-groomed  Eyes: No inflammation or discharge of conjunctiva or lids; sclera clear   Pharynx: no inflammation, lesion, or mass of lips  Pulmonary/Chest: Effort normal. No respiratory distress.     Ortho Exam   Ankle Examination (focused):     Gait: no limp while weightbearing in flip flops      RIGHT   Inspection Erythema none    Edema 1+localized over lateral " malleolar aspect of ankle    Ecchymosis none        ROM:  Plantarflexion 50    Dorsiflexion 20        Strength Pronation 5/5    Supination 5/5    Foot plantarflexion 5/5    Foot dorsflexion 5/5        TTP AiTFL no    ATFL +mild    CFL +mild    PTFL no    Achilles no    Deltoid no    Peroneal +mild    Tib Ant no    Tib Post no        TTP (Bony) Prox Fibula no    Lat Malleolus no    Base of 5th MT no    Med Malleolus no    Navicular no    Talar Dome no        Anterior Drawer ATFL Positive pain w/o laxity   Calcaneal Squeeze  negative   Tib-Fib Squeeze Test  negative   Talar Tilt (stab tib,DF foot,invert foot) CFL negative   ER Stress (stab tib,ER foot) High ankle negative   Eversion stress (stab tib, shelli foot) deltoid negative   Tinel's   negative   MT Compression  negative         No calf tenderness to palpation     LE NV Exam: +2 DP/PT pulses   Sensation intact to light touch  Flexion/extension of toes intact          Procedures

## 2024-07-02 NOTE — INTERVAL H&P NOTE
H&P reviewed. After examining the patient I find no changes in the patients condition since the H&P had been written.    Vitals:    07/02/24 0821   BP: 123/91   Pulse: 89   Resp: 20   SpO2: 97%

## 2024-07-10 ENCOUNTER — OFFICE VISIT (OUTPATIENT)
Dept: PAIN MEDICINE | Facility: CLINIC | Age: 59
End: 2024-07-10
Payer: COMMERCIAL

## 2024-07-10 VITALS
WEIGHT: 200 LBS | OXYGEN SATURATION: 97 % | RESPIRATION RATE: 18 BRPM | HEIGHT: 65 IN | DIASTOLIC BLOOD PRESSURE: 80 MMHG | BODY MASS INDEX: 33.32 KG/M2 | TEMPERATURE: 98 F | SYSTOLIC BLOOD PRESSURE: 122 MMHG | HEART RATE: 80 BPM

## 2024-07-10 DIAGNOSIS — M47.816 LUMBAR SPONDYLOSIS: Primary | ICD-10-CM

## 2024-07-10 PROCEDURE — 99213 OFFICE O/P EST LOW 20 MIN: CPT | Performed by: ANESTHESIOLOGY

## 2024-07-10 NOTE — PROGRESS NOTES
Assessment  1. Lumbar spondylosis    Greater than 80% relief of pain with improved ability to participate with IADLs after bilateral L3, L4, L5 medial branch blocks #1 and #2 for more than one week at a time. Continues to describe symptoms more consistent with axial low back pain without radicular features; findings are consistent with lumbar facet arthropathy noted on recent MRI. Previously reported the following symptomatology:     Axial low back pain described primarily by arthritic features.  Aching, nagging, indolent, stabbing, throbbing features in axial low back without radicular features.  5/5 strength bilaterally, negative SLR.  Positive facet loading maneuvers in lumbar spine elicited pain, positive tenderness to palpation over lumbar paraspinal muscles.Prior L4-L5 fusion in 2016.  On MRI at L3-L4 moderate central canal stenosis with prominent lumbar spondylosis throughout lumbar spine. Currently she is neurologically intact without gait instability, saddle anesthesia or bowel/bladder abnormality. Risks, benefits alternative to MBBs/RFA thoroughly discussed with patient.  Handouts provided questions answered to patient satisfaction.   Plan  -bilateral L3, L4, L5 medial branch nerve radiofrequency ablation; f/u 2 weeks post procedure  -DXA spine hip and pelvis showed osteopenia; will f/u with pcp for further recommendations  -lyrica 75mg TID ordered for patient; has since tapered. counseled regarding sedative effects of taking this medication and provided up titration calendar.  Counseled not to take medication while driving or operating heavy machinery/using stairs  -has completed formal PT; Physician directed home exercise plan as per [employer] demonstrated and handouts provided that patient plans to participate with for 1 hour, twice a week for the next 6 weeks.     Review of external notes including PT notes, PCP notes and specialist notes was performed at this visit in addition to review of new ordered  imaging and past imaging to develop or modify multidisciplinary pain plan      There are risks associated with opioid medications, including dependence, addiction and tolerance. The patient understands and agrees to use these medications only as prescribed. Potential side effects of the medications include, but are not limited to, constipation, drowsiness, addiction, impaired judgment and risk of fatal overdose if not taken as prescribed. The patient was warned against driving while taking sedation medications or operating heavy machinery. The patient voiced understanding. Sharing medications is a felony. At this point in time, the patient is showing no signs of addiction, abuse, diversion or suicidal ideation.     [place] Prescription Drug Monitoring Program report was reviewed and was appropriate      Complete risks and benefits including bleeding, infection, tissue reaction, nerve injury and allergic reaction were discussed. The approach was demonstrated using models and literature was provided. Verbal and written consent was obtained.     My impressions and treatment recommendations were discussed in detail with the patient who verbalized understanding and had no further questions.  Discharge instructions were provided. I personally saw and examined the patient and I agree with the above discussed plan of care.      No orders of the defined types were placed in this encounter.      History of Present Illness    Greater than 80% relief of pain with improved ability to participate with IADLs after bilateral L3, L4, L5 medial branch blocks #1 and #2 for more than one week at a time. Continues to describe symptoms more consistent with axial low back pain without radicular features; findings are consistent with lumbar facet arthropathy noted on recent MRI. Previously reported the following symptomatology:     June Edward is a 58 y.o. female with pmhx of factor V leiden deficiency on xarelto (DVT, PE in 2014,  prior L4-L5 fusion in presenting with chronic axial low back pain described primarily as arthritic in nature. She describes 8/10 low back pain that is worse in the mornings and worse at the end of the day.  The pain is characterized by achy, nagging, indolent, crampy, stabbing pain in her left sided axial low back.  The patient describes that the pain is worse with standing for long periods of time on hard surfaces as well as with walking.  The patient is a very active individual and feels as though this pain compromises her participation with independent activities of daily living. The pain can be debilitating at times and contribute to significant disability, compromising overall activity and independent activities of daily living.  She has tried physical therapy with limited relief of symptoms.  Medications the patient has tried in the past include nsaids, tylenol. She describes no radicular symptoms and has good strength. The patient denies any bowel or bladder dysfunction as well, saddle anesthesia or gait instability.      I have personally reviewed and/or updated the patient's past medical history, past surgical history, family history, social history, current medications, allergies, and vital signs today.     Review of Systems   Constitutional:  Positive for activity change.   HENT: Negative.     Eyes: Negative.    Respiratory: Negative.     Cardiovascular: Negative.    Gastrointestinal: Negative.    Endocrine: Negative.    Genitourinary: Negative.    Musculoskeletal:  Positive for arthralgias, back pain and myalgias. Negative for gait problem.   Skin: Negative.    Allergic/Immunologic: Negative.    Neurological:  Negative for weakness and numbness.   Hematological: Negative.    Psychiatric/Behavioral: Negative.     All other systems reviewed and are negative.      Patient Active Problem List   Diagnosis    Postlaminectomy syndrome    Lumbar spondylosis    Sacroiliitis (HCC)    Lumbar radiculopathy        Past Medical History:   Diagnosis Date    Back pain     DVT (deep vein thrombosis) in pregnancy     Factor 5 Leiden mutation, heterozygous (HCC)     High cholesterol     Pulmonary embolism (HCC)        Past Surgical History:   Procedure Laterality Date    BACK SURGERY      DILATION AND CURETTAGE OF UTERUS      ENDOMETRIAL ABLATION      EPIDURAL BLOCK INJECTION N/A 04/18/2023    Procedure: BLOCK / INJECTION EPIDURAL STEROID LUMBAR L5-S1;  Surgeon: Kp Barry MD;  Location: [facility];  Service: Pain Management     EPIDURAL BLOCK INJECTION N/A 09/28/2023    Procedure: BLOCK / INJECTION EPIDURAL STEROID LUMBAR L3-L4;  Surgeon: Kp Barry MD;  Location: [facility];  Service: Pain Management     IR DVT THROMBOLYSIS/THROMBECTOMY LOWER EXTREMITY WITH VENOGRAM      IR SPINE AND PAIN PROCEDURE  2/13/2024    IR SPINE AND PAIN PROCEDURE  5/30/2024    IR SPINE AND PAIN PROCEDURE  7/2/2024    PA INJECT SI JOINT ARTHRGRPHY&/ANES/STEROID W/RYDER Left 03/16/2023    Procedure: BLOCK / INJECTION SACROILIAC;  Surgeon: Kp Barry MD;  Location: [facility];  Service: Pain Management        History reviewed. No pertinent family history.    Social History     Occupational History    Not on file   Tobacco Use    Smoking status: Never    Smokeless tobacco: Never   Vaping Use    Vaping status: Some Days    Substances: THC   Substance and Sexual Activity    Alcohol use: Yes     Comment: social    Drug use: Yes     Types: Marijuana    Sexual activity: Not on file       Current Outpatient Medications on File Prior to Visit   Medication Sig    atorvastatin (LIPITOR) 20 mg tablet Take 20 mg by mouth    CALCIUM PO     Cholecalciferol (Vitamin D3) POWD     fluticasone (FLONASE) 50 mcg/act nasal spray 2 sprays into each nostril daily    multivitamin (THERAGRAN) TABS Take 1 tablet by mouth daily    rivaroxaban (XARELTO) 10 mg tablet Take 1 tablet by mouth daily    sertraline (ZOLOFT) 25 mg tablet Take 1/2 tablet once daily x4 days,  "then 1 tablet daily     No current facility-administered medications on file prior to visit.       Allergies   Allergen Reactions    Ampicillin Rash    Sulfa Antibiotics Rash       Physical Exam    /80 (BP Location: Left arm, Patient Position: Sitting, Cuff Size: Adult)   Pulse 80   Temp 98 °F (36.7 °C)   Resp 18   Ht 5' 5\" (1.651 m)   Wt 90.7 kg (200 lb)   SpO2 97%   BMI 33.28 kg/m²     Constitutional: normal, well developed, well nourished, alert, in no distress and non-toxic and no overt pain behavior. and obese  Eyes: anicteric  HEENT: grossly intact  Neck: supple, symmetric, trachea midline and no masses   Pulmonary:even and unlabored  Cardiovascular:No edema or pitting edema present  Skin:Normal without rashes or lesions and well hydrated  Psychiatric:Mood and affect appropriate  Neurologic:Cranial Nerves II-XII grossly intact Sensation grossly intact; no clonus negative piper's. Reflexes 2+ and brisk. SLR negative bilaterally.  Musculoskeletal:normal gait. 5/5 strength bilaterally with AROM in lower extremities. Difficulty with normal heel toe and tip toe walking. Significant pain with lumbar facet loading bilaterally and with lateral spine rotation. ttp over lumbar paraspinal muscles. Positive iram's test,  gaenslen's SIJ loading left sided bilaterally.    Imaging     On MRI at L3-L4 moderate central canal stenosis with mild bilateral neuroforaminal stenosis, at L4-L5 grade 1 spondylolisthesis of L4 on L5 with postsurgical changes, posterior central disc herniation. At L5-S1 a left central disc herniation leading to mild left sided neural foraminal stenosis, and small synovial cyst abutting left S1 spinal nerve.    "

## 2024-07-10 NOTE — H&P (VIEW-ONLY)
Assessment  1. Lumbar spondylosis    Greater than 80% relief of pain with improved ability to participate with IADLs after bilateral L3, L4, L5 medial branch blocks #1 and #2 for more than one week at a time. Continues to describe symptoms more consistent with axial low back pain without radicular features; findings are consistent with lumbar facet arthropathy noted on recent MRI.     The patient has been experiencing moderate to severe axial spine pain that is causing functional deficit.  The pain has been present for at least 3 months and is not improving with conservative care.  Currently the patient is not experiencing any radicular features nor neurogenic claudication.  Non-facet pathology has been ruled out on clinical evaluation.    Axial low back pain described primarily by arthritic features.  Aching, nagging, indolent, stabbing, throbbing features in axial low back without radicular features.  5/5 strength bilaterally, negative SLR.  Positive facet loading maneuvers in lumbar spine elicited pain, positive tenderness to palpation over lumbar paraspinal muscles.Prior L4-L5 fusion in 2016.  On MRI at L3-L4 moderate central canal stenosis with prominent lumbar spondylosis throughout lumbar spine. Currently she is neurologically intact without gait instability, saddle anesthesia or bowel/bladder abnormality. Risks, benefits alternative to MBBs/RFA thoroughly discussed with patient.  Handouts provided questions answered to patient satisfaction.   Plan  -bilateral L3, L4, L5 medial branch nerve radiofrequency ablation; f/u 2 weeks post procedure  -DXA spine hip and pelvis showed osteopenia; will f/u with pcp for further recommendations  -lyrica 75mg TID ordered for patient; has since tapered. counseled regarding sedative effects of taking this medication and provided up titration calendar.  Counseled not to take medication while driving or operating heavy machinery/using stairs  -has completed formal PT;  Physician directed home exercise plan as per AAOS demonstrated and handouts provided that patient plans to participate with for 1 hour, twice a week for the next 6 weeks.     Review of external notes including PT notes, PCP notes and specialist notes was performed at this visit in addition to review of new ordered imaging and past imaging to develop or modify multidisciplinary pain plan      There are risks associated with opioid medications, including dependence, addiction and tolerance. The patient understands and agrees to use these medications only as prescribed. Potential side effects of the medications include, but are not limited to, constipation, drowsiness, addiction, impaired judgment and risk of fatal overdose if not taken as prescribed. The patient was warned against driving while taking sedation medications or operating heavy machinery. The patient voiced understanding. Sharing medications is a felony. At this point in time, the patient is showing no signs of addiction, abuse, diversion or suicidal ideation.     [place] Prescription Drug Monitoring Program report was reviewed and was appropriate      Complete risks and benefits including bleeding, infection, tissue reaction, nerve injury and allergic reaction were discussed. The approach was demonstrated using models and literature was provided. Verbal and written consent was obtained.     My impressions and treatment recommendations were discussed in detail with the patient who verbalized understanding and had no further questions.  Discharge instructions were provided. I personally saw and examined the patient and I agree with the above discussed plan of care.      No orders of the defined types were placed in this encounter.      History of Present Illness    Greater than 80% relief of pain with improved ability to participate with IADLs after bilateral L3, L4, L5 medial branch blocks #1 and #2 for more than one week at a time. Continues to  describe symptoms more consistent with axial low back pain without radicular features; findings are consistent with lumbar facet arthropathy noted on recent MRI.     The patient has been experiencing moderate to severe axial spine pain that is causing functional deficit.  The pain has been present for at least 3 months and is not improving with conservative care.  Currently the patient is not experiencing any radicular features nor neurogenic claudication.  Non-facet pathology has been ruled out on clinical evaluation.    June Edward is a 58 y.o. female with pmhx of factor V leiden deficiency on xarelto (DVT, PE in 2014, prior L4-L5 fusion in presenting with chronic axial low back pain described primarily as arthritic in nature. She describes 8/10 low back pain that is worse in the mornings and worse at the end of the day.  The pain is characterized by achy, nagging, indolent, crampy, stabbing pain in her left sided axial low back.  The patient describes that the pain is worse with standing for long periods of time on hard surfaces as well as with walking.  The patient is a very active individual and feels as though this pain compromises her participation with independent activities of daily living. The pain can be debilitating at times and contribute to significant disability, compromising overall activity and independent activities of daily living.  She has tried physical therapy with limited relief of symptoms.  Medications the patient has tried in the past include nsaids, tylenol. She describes no radicular symptoms and has good strength. The patient denies any bowel or bladder dysfunction as well, saddle anesthesia or gait instability.      I have personally reviewed and/or updated the patient's past medical history, past surgical history, family history, social history, current medications, allergies, and vital signs today.     Review of Systems   Constitutional:  Positive for activity change.   HENT:  Negative.     Eyes: Negative.    Respiratory: Negative.     Cardiovascular: Negative.    Gastrointestinal: Negative.    Endocrine: Negative.    Genitourinary: Negative.    Musculoskeletal:  Positive for arthralgias, back pain and myalgias. Negative for gait problem.   Skin: Negative.    Allergic/Immunologic: Negative.    Neurological:  Negative for weakness and numbness.   Hematological: Negative.    Psychiatric/Behavioral: Negative.     All other systems reviewed and are negative.      Patient Active Problem List   Diagnosis    Postlaminectomy syndrome    Lumbar spondylosis    Sacroiliitis (HCC)    Lumbar radiculopathy       Past Medical History:   Diagnosis Date    Back pain     DVT (deep vein thrombosis) in pregnancy     Factor 5 Leiden mutation, heterozygous (HCC)     High cholesterol     Pulmonary embolism (HCC)        Past Surgical History:   Procedure Laterality Date    BACK SURGERY      DILATION AND CURETTAGE OF UTERUS      ENDOMETRIAL ABLATION      EPIDURAL BLOCK INJECTION N/A 04/18/2023    Procedure: BLOCK / INJECTION EPIDURAL STEROID LUMBAR L5-S1;  Surgeon: Kp Barry MD;  Location: [facility];  Service: Pain Management     EPIDURAL BLOCK INJECTION N/A 09/28/2023    Procedure: BLOCK / INJECTION EPIDURAL STEROID LUMBAR L3-L4;  Surgeon: Kp Barry MD;  Location: [facility];  Service: Pain Management     IR DVT THROMBOLYSIS/THROMBECTOMY LOWER EXTREMITY WITH VENOGRAM      IR SPINE AND PAIN PROCEDURE  2/13/2024    IR SPINE AND PAIN PROCEDURE  5/30/2024    IR SPINE AND PAIN PROCEDURE  7/2/2024    SD INJECT SI JOINT ARTHRGRPHY&/ANES/STEROID W/RYDER Left 03/16/2023    Procedure: BLOCK / INJECTION SACROILIAC;  Surgeon: Kp Barry MD;  Location: [facility];  Service: Pain Management        History reviewed. No pertinent family history.    Social History     Occupational History    Not on file   Tobacco Use    Smoking status: Never    Smokeless tobacco: Never   Vaping Use    Vaping status: Some Days     "Substances: THC   Substance and Sexual Activity    Alcohol use: Yes     Comment: social    Drug use: Yes     Types: Marijuana    Sexual activity: Not on file       Current Outpatient Medications on File Prior to Visit   Medication Sig    atorvastatin (LIPITOR) 20 mg tablet Take 20 mg by mouth    CALCIUM PO     Cholecalciferol (Vitamin D3) POWD     fluticasone (FLONASE) 50 mcg/act nasal spray 2 sprays into each nostril daily    multivitamin (THERAGRAN) TABS Take 1 tablet by mouth daily    rivaroxaban (XARELTO) 10 mg tablet Take 1 tablet by mouth daily    sertraline (ZOLOFT) 25 mg tablet Take 1/2 tablet once daily x4 days, then 1 tablet daily     No current facility-administered medications on file prior to visit.       Allergies   Allergen Reactions    Ampicillin Rash    Sulfa Antibiotics Rash       Physical Exam    /80 (BP Location: Left arm, Patient Position: Sitting, Cuff Size: Adult)   Pulse 80   Temp 98 °F (36.7 °C)   Resp 18   Ht 5' 5\" (1.651 m)   Wt 90.7 kg (200 lb)   SpO2 97%   BMI 33.28 kg/m²     Constitutional: normal, well developed, well nourished, alert, in no distress and non-toxic and no overt pain behavior. and obese  Eyes: anicteric  HEENT: grossly intact  Neck: supple, symmetric, trachea midline and no masses   Pulmonary:even and unlabored  Cardiovascular:No edema or pitting edema present  Skin:Normal without rashes or lesions and well hydrated  Psychiatric:Mood and affect appropriate  Neurologic:Cranial Nerves II-XII grossly intact Sensation grossly intact; no clonus negative piper's. Reflexes 2+ and brisk. SLR negative bilaterally.  Musculoskeletal:normal gait. 5/5 strength bilaterally with AROM in lower extremities. Difficulty with normal heel toe and tip toe walking. Significant pain with lumbar facet loading bilaterally and with lateral spine rotation. ttp over lumbar paraspinal muscles. Positive iram's test,  gaenslen's SIJ loading left sided bilaterally.    Imaging     On MRI " at L3-L4 moderate central canal stenosis with mild bilateral neuroforaminal stenosis, at L4-L5 grade 1 spondylolisthesis of L4 on L5 with postsurgical changes, posterior central disc herniation. At L5-S1 a left central disc herniation leading to mild left sided neural foraminal stenosis, and small synovial cyst abutting left S1 spinal nerve.

## 2024-07-10 NOTE — PATIENT INSTRUCTIONS
Patient Education     Core Strengthening Exercises on Back or on Hands and Knees   About this topic   Your core muscles are in your chest, back, buttock, and stomach area. They are your abdominal, back, and pelvis muscles. These muscles help keep your body stable when using your arms or legs. They also help with balance and posture. There are many exercises you can do to keep these muscles strong.  If you have back problems like a compression fracture or a ruptured disc, doing some of these exercises could make your problem worse. Some of these exercises may cause lower back pain.  General   Before starting with a program, ask your doctor if you are healthy enough to do these exercises. Your doctor may have you work with a , chiropractor, or physical therapist to make a safe exercise program to meet your needs.  Strengthening Exercises   Strengthening exercises keep your muscles firm and strong. Start by repeating each exercise 2 to 3 times. Work up to doing each exercise 10 times. Try to do the exercises 2 to 3 times each day. Hold each exercise for 3 to 5 seconds. Do all exercises slowly.  Hip lifts ? Lie on your back with your knees bent and feet flat on the floor. Tighten your stomach muscles and push your heels into the floor to lift your buttocks off the floor. Relax.  Pelvic tilts ? Lie on your back with your knees bent and feet flat on the floor. Tighten your stomach muscles and press your lower back down to the floor. Relax.  Straight leg raises lying down ? Lie on your back with one leg straight. Bend your other knee so the foot is flat on the bed. Keeping your leg straight, lift the leg up to the level of your other knee. Lower it back down. Repeat with the other leg.  Knee flex lying down ? Lie on your back with both knees bent and your feet flat on the floor. Tighten your belly muscles. Raise one leg up and back down as if you are marching in slow motion. Keep belly muscles tight while you move  your leg. Switch legs. To make this exercise harder, raise both arms straight up in the air. Tighten your belly muscles. When you raise one leg up, reach the opposite arm over your head. Switch, moving the opposite arm and leg until you have done 10 repetitions on each side.  Abdominal crunches ? Lie on your back with both knees bent. Keep your feet flat on the floor. Place your hands in one of these positions. Try starting with the first position since it is the easiest. As you get better, use the other positions to make it harder.  Crunches with arms at sides.  Crunches with arms across chest.  Crunches with arms behind head. Be careful not to interlock your fingers behind your neck or head while doing crunches. This may add tension to your neck and cause strain.  Look at the ceiling. Tighten your belly muscles and lift your shoulders and upper back off the floor. Breathe out while you are doing this. Lower your shoulders to the floor. Breathe in while you are doing this. Relax your belly muscles all the way before starting another crunch.  Arm and leg lifts on hands and knees ? Start on your hands and knees. With all of these exercises, keep your back as level as possible. If you are having trouble with this, you may want to put a small object on your back such as a book. If it falls off, you are not keeping your back level enough during the exercise.  Lift one arm up to shoulder level and hold. Lower it back down. Now, lift up the other arm and hold.  Lift one leg up and kick it straight out until it is in line with your back and hold. Lower it back down. Now, lift up the other leg and hold.  Lift one arm and the OPPOSITE leg up at the same time and hold. Lower them down. Now, repeat using the other arm and leg. This is a very hard exercise. It may take time to be able to do this.               What will the results be?   Stronger core  Better balance  More toned belly and back muscles  Easier to do daily  activities  Better sports performance  Less low back pain  Helpful tips   Stay active and work out to keep your muscles strong and flexible.  Keep a healthy weight to avoid putting too much stress on your spine. Eat a healthy diet to keep your muscles healthy.  Be sure you do not hold your breath when exercising. This can raise your blood pressure. If you tend to hold your breath, try counting out loud when exercising. If any exercise bothers you, stop right away.  Try walking or cycling at an easy pace for a few minutes to warm up your muscles. Do this again after exercising.  Exercise may be slightly uncomfortable, but you should not have sharp pains. If you do get sharp pains, stop what you are doing. If the sharp pains continue, call your doctor.  Last Reviewed Date   2021-03-18  Consumer Information Use and Disclaimer   This generalized information is a limited summary of diagnosis, treatment, and/or medication information. It is not meant to be comprehensive and should be used as a tool to help the user understand and/or assess potential diagnostic and treatment options. It does NOT include all information about conditions, treatments, medications, side effects, or risks that may apply to a specific patient. It is not intended to be medical advice or a substitute for the medical advice, diagnosis, or treatment of a health care provider based on the health care provider's examination and assessment of a patient’s specific and unique circumstances. Patients must speak with a health care provider for complete information about their health, medical questions, and treatment options, including any risks or benefits regarding use of medications. This information does not endorse any treatments or medications as safe, effective, or approved for treating a specific patient. [employer] and its affiliates disclaim any warranty or liability relating to this information or the use thereof. The use of this information  is governed by the Terms of Use, available at https://www.wolters0-6.comuwer.com/en/know/clinical-effectiveness-terms   Copyright   Copyright © 2024 [employer] and its affiliates and/or licensors. All rights reserved.

## 2024-07-11 ENCOUNTER — PREP FOR PROCEDURE (OUTPATIENT)
Dept: PAIN MEDICINE | Facility: CLINIC | Age: 59
End: 2024-07-11

## 2024-07-11 DIAGNOSIS — M47.816 LUMBAR SPONDYLOSIS: Primary | ICD-10-CM

## 2024-08-06 ENCOUNTER — ANESTHESIA (OUTPATIENT)
Dept: INTERVENTIONAL RADIOLOGY/VASCULAR | Facility: HOSPITAL | Age: 59
End: 2024-08-06

## 2024-08-06 ENCOUNTER — HOSPITAL ENCOUNTER (OUTPATIENT)
Dept: INTERVENTIONAL RADIOLOGY/VASCULAR | Facility: HOSPITAL | Age: 59
Discharge: HOME | End: 2024-08-06
Attending: ANESTHESIOLOGY | Admitting: ANESTHESIOLOGY
Payer: COMMERCIAL

## 2024-08-06 ENCOUNTER — ANESTHESIA EVENT (OUTPATIENT)
Dept: INTERVENTIONAL RADIOLOGY/VASCULAR | Facility: HOSPITAL | Age: 59
End: 2024-08-06

## 2024-08-06 ENCOUNTER — TELEPHONE (OUTPATIENT)
Dept: PAIN MEDICINE | Facility: CLINIC | Age: 59
End: 2024-08-06

## 2024-08-06 VITALS
HEIGHT: 65 IN | HEART RATE: 63 BPM | OXYGEN SATURATION: 94 % | RESPIRATION RATE: 16 BRPM | BODY MASS INDEX: 33.32 KG/M2 | TEMPERATURE: 97.6 F | SYSTOLIC BLOOD PRESSURE: 109 MMHG | WEIGHT: 200 LBS | DIASTOLIC BLOOD PRESSURE: 58 MMHG

## 2024-08-06 DIAGNOSIS — M47.816 LUMBAR SPONDYLOSIS: ICD-10-CM

## 2024-08-06 PROCEDURE — 64636 DESTROY L/S FACET JNT ADDL: CPT | Performed by: ANESTHESIOLOGY

## 2024-08-06 PROCEDURE — 64635 DESTROY LUMB/SAC FACET JNT: CPT | Performed by: ANESTHESIOLOGY

## 2024-08-06 RX ORDER — PROPOFOL 10 MG/ML
INJECTION, EMULSION INTRAVENOUS CONTINUOUS PRN
Status: DISCONTINUED | OUTPATIENT
Start: 2024-08-06 | End: 2024-08-06

## 2024-08-06 RX ORDER — BUPIVACAINE HCL/PF 2.5 MG/ML
VIAL (ML) INJECTION AS NEEDED
Status: COMPLETED | OUTPATIENT
Start: 2024-08-06 | End: 2024-08-06

## 2024-08-06 RX ORDER — SODIUM CHLORIDE, SODIUM LACTATE, POTASSIUM CHLORIDE, CALCIUM CHLORIDE 600; 310; 30; 20 MG/100ML; MG/100ML; MG/100ML; MG/100ML
INJECTION, SOLUTION INTRAVENOUS CONTINUOUS PRN
Status: DISCONTINUED | OUTPATIENT
Start: 2024-08-06 | End: 2024-08-06

## 2024-08-06 RX ORDER — LIDOCAINE HYDROCHLORIDE 10 MG/ML
INJECTION, SOLUTION EPIDURAL; INFILTRATION; INTRACAUDAL; PERINEURAL AS NEEDED
Status: COMPLETED | OUTPATIENT
Start: 2024-08-06 | End: 2024-08-06

## 2024-08-06 RX ORDER — MIDAZOLAM HYDROCHLORIDE 2 MG/2ML
INJECTION, SOLUTION INTRAMUSCULAR; INTRAVENOUS AS NEEDED
Status: DISCONTINUED | OUTPATIENT
Start: 2024-08-06 | End: 2024-08-06

## 2024-08-06 RX ORDER — LIDOCAINE HYDROCHLORIDE 20 MG/ML
INJECTION, SOLUTION EPIDURAL; INFILTRATION; INTRACAUDAL; PERINEURAL AS NEEDED
Status: COMPLETED | OUTPATIENT
Start: 2024-08-06 | End: 2024-08-06

## 2024-08-06 RX ORDER — EPHEDRINE SULFATE 50 MG/ML
INJECTION INTRAVENOUS AS NEEDED
Status: DISCONTINUED | OUTPATIENT
Start: 2024-08-06 | End: 2024-08-06

## 2024-08-06 RX ORDER — FENTANYL CITRATE 50 UG/ML
INJECTION, SOLUTION INTRAMUSCULAR; INTRAVENOUS AS NEEDED
Status: DISCONTINUED | OUTPATIENT
Start: 2024-08-06 | End: 2024-08-06

## 2024-08-06 RX ORDER — METHYLPREDNISOLONE ACETATE 80 MG/ML
INJECTION, SUSPENSION INTRA-ARTICULAR; INTRALESIONAL; INTRAMUSCULAR; PARENTERAL; SOFT TISSUE AS NEEDED
Status: COMPLETED | OUTPATIENT
Start: 2024-08-06 | End: 2024-08-06

## 2024-08-06 RX ADMIN — EPHEDRINE SULFATE 10 MG: 50 INJECTION, SOLUTION INTRAVENOUS at 08:41

## 2024-08-06 RX ADMIN — PROPOFOL 50 MG: 10 INJECTION, EMULSION INTRAVENOUS at 08:21

## 2024-08-06 RX ADMIN — DEXMEDETOMIDINE HYDROCHLORIDE 10 MCG: 100 INJECTION, SOLUTION INTRAVENOUS at 08:17

## 2024-08-06 RX ADMIN — METHYLPREDNISOLONE ACETATE 80 MG: 80 INJECTION, SUSPENSION INTRA-ARTICULAR; INTRALESIONAL; INTRAMUSCULAR; SOFT TISSUE at 08:25

## 2024-08-06 RX ADMIN — EPHEDRINE SULFATE 10 MG: 50 INJECTION, SOLUTION INTRAVENOUS at 08:40

## 2024-08-06 RX ADMIN — FENTANYL CITRATE 25 MCG: 50 INJECTION INTRAMUSCULAR; INTRAVENOUS at 08:17

## 2024-08-06 RX ADMIN — LIDOCAINE HYDROCHLORIDE 5 ML: 20 INJECTION, SOLUTION EPIDURAL; INFILTRATION; INTRACAUDAL; PERINEURAL at 08:25

## 2024-08-06 RX ADMIN — SODIUM CHLORIDE, SODIUM LACTATE, POTASSIUM CHLORIDE, AND CALCIUM CHLORIDE: .6; .31; .03; .02 INJECTION, SOLUTION INTRAVENOUS at 08:15

## 2024-08-06 RX ADMIN — FENTANYL CITRATE 25 MCG: 50 INJECTION INTRAMUSCULAR; INTRAVENOUS at 08:23

## 2024-08-06 RX ADMIN — MIDAZOLAM 2 MG: 1 INJECTION INTRAMUSCULAR; INTRAVENOUS at 08:17

## 2024-08-06 RX ADMIN — LIDOCAINE HYDROCHLORIDE 10 ML: 10 INJECTION, SOLUTION EPIDURAL; INFILTRATION; INTRACAUDAL; PERINEURAL at 08:24

## 2024-08-06 RX ADMIN — BUPIVACAINE HYDROCHLORIDE 5 ML: 2.5 INJECTION, SOLUTION EPIDURAL; INFILTRATION; INTRACAUDAL; PERINEURAL at 08:24

## 2024-08-06 RX ADMIN — PROPOFOL 150 MCG/KG/MIN: 10 INJECTION, EMULSION INTRAVENOUS at 08:22

## 2024-08-06 RX ADMIN — FENTANYL CITRATE 25 MCG: 50 INJECTION INTRAMUSCULAR; INTRAVENOUS at 08:25

## 2024-08-06 RX ADMIN — DEXMEDETOMIDINE HYDROCHLORIDE 10 MCG: 100 INJECTION, SOLUTION INTRAVENOUS at 08:21

## 2024-08-06 NOTE — INTERVAL H&P NOTE
H&P reviewed. After examining the patient I find no changes in the patients condition since the H&P had been written.    Vitals:    08/06/24 0707   BP: 133/87   Pulse: 68   Resp: 18   Temp: (!) 97.1 °F (36.2 °C)   SpO2: 98%

## 2024-08-06 NOTE — ANESTHESIA PREPROCEDURE EVALUATION
"Procedure:  IR SPINE AND PAIN PROCEDURE    Relevant Problems   MUSCULOSKELETAL   (+) Lumbar spondylosis   (+) Sacroiliitis (HCC)        Physical Exam    Airway    Mallampati score: II  TM Distance: >3 FB  Neck ROM: full     Dental   No notable dental hx     Cardiovascular      Pulmonary      Other Findings  post-pubertal.    Anesthesia Plan  ASA Score- 2     Anesthesia Type- IV sedation with anesthesia with ASA Monitors.         Additional Monitors:     Airway Plan:            Plan Factors-Exercise tolerance (METS): >4 METS.    Chart reviewed.   Existing labs reviewed. Patient summary reviewed.                  Induction- intravenous.    Postoperative Plan-     Perioperative Resuscitation Plan - Level 1 - Full Code.       Informed Consent- Anesthetic plan and risks discussed with patient.  I personally reviewed this patient with the CRNA. Discussed and agreed on the Anesthesia Plan with the CRNA..      VITALS  /87   Pulse 68   Temp (!) 97.1 °F (36.2 °C) (Temporal)   Resp 18   Ht 5' 5\" (1.651 m)   Wt 90.7 kg (200 lb)   SpO2 98%   BMI 33.28 kg/m²  BP Readings from Last 3 Encounters:   08/06/24 133/87   07/10/24 122/80   07/02/24 122/91        LABS  No results for input(s): \"WBC\", \"HGB\", \"HCT\", \"PLT\" in the last 8784 hours.  Results from Last 12 Months   Lab Units 02/10/24  0000   SODIUM mmol/L 141   POTASSIUM mmol/L 4.8   CHLORIDE mmol/L 104   CO2 mmol/L 28   BUN mg/dL 14   CREATININE mg/dL 0.71   CALCIUM mg/dL 10.1   GLUCOSE RANDOM mg/dL 100*     No results for input(s): \"APTT\", \"INR\", \"PTT\" in the last 8784 hours.    ECG      ECHOCARDIOGRAPHY OR OTHER TESTING/IMAGING  N/aNo results found for this or any previous visit (from the past 4464 hour(s)).  No results found for this or any previous visit. N/a     ------- ANESTHESIA RISK-BENEFIT DISCUSSION -------  BENEFITS OF A SPECIALIZED ANESTHESIA TEAM INCLUDE (NBK [id number], PMID 79769474):  (1) Reduce mortality and morbidity for major surgeries/procedures. (2) " The team provides analgesia/sedation/amnesia/akinesia as safely as possible. (3) The team strives to reduce discomfort as safely as possible.    RISKS, AND PLANS TO MITIGATE RISKS, INCLUDE:    - Neurologic system: IntraOp awareness (Risk is ~1:1,000 - 1:14,000; PMID 87785392), Stroke (Risk ~<0.1-2% for most cases; PMID 96304339), nerve injury, vision loss, and POCD.     - Airway and Pulmonary system: Dental or mouth injury, throat pain, critical hypoxia, pneumothorax, prolonged intubation, post-op respiratory compromise.  Airway/Intubation risks and prior data: No prior advanced airway notes in [facility] EMR  Major ARISCAT risk factors for pulmonary complications include: none, yielding a score of 0-1= Low risk, 1.6%.  - Cardiovascular system: Hypotension, arrhythmias, cardiac injury or arrest, blood clots, bleeding, infection, vascular injuries.  Taurus's RCRI score items: none, yielding an RCRI Score of 0= 0.4% risk of MACE  Are omega-op or intra-op beta blockers indicated? (PMID 34604316): no  - FEN/GI system: Aspiration risk (~0.5% per PMC 2284258) and PONV (10-80% per Apfel score) especially if the patient has not fasted.  ASA NPO guideline compliance?: Yes  - Medication risk assessment: Allergic reactions, excessive bleeding with anticoagulant use, overdoses, drug-drug interactions, injury to a fetus or  in pregnant or breastfeeding patients, omega-procedural sedation including while driving/operating machinery.  Recent relevant medications: See MAR or Med Review  Personal or family history of anesthesia complications: no  Pregnancy Status: N/A  - Estimate mortality risks associated with anesthesia based on ASA-PS (PMID 12733072): ASA-PS II: risk 1:20,000

## 2024-08-06 NOTE — DISCHARGE INSTR - AVS FIRST PAGE
YOUR 2 WEEK FOLLOW UP HAS BEEN SCHEDULED; IF YOU WISH TO CHANGE THE FOLLOW UP, PLEASE CALL THE [facility] AT [place]: [phone]    Lumbar Radiofrequency Ablation   WHAT YOU NEED TO KNOW:   Lumbar radiofrequency ablation (RFA) is a procedure used to treat facet joint pain in your lower back. Facet joints are found at the back of each vertebra. A needle electrode is used to send electrical currents to the nerves in your facet joint. The electrical currents create heat that damages the nerve so it cannot send pain signals.   DISCHARGE INSTRUCTIONS:   Seek care immediately if:   You cannot move your leg.    You cannot control your urine or bowel movements.    You have severe pain in your lower back.    Call your doctor if:   You have leg weakness.     You develop new symptoms.     You have questions or concerns about your condition or care.    Medicines:   Pain medicine  may be given. Ask how to take this medicine safely.    Take your medicine as directed.  Contact your healthcare provider if you think your medicine is not helping or if you have side effects. Tell your provider if you are allergic to any medicine. Keep a list of the medicines, vitamins, and herbs you take. Include the amounts, and when and why you take them. Bring the list or the pill bottles to follow-up visits. Carry your medicine list with you in case of an emergency.    Activity:  Do not drive a car or operate machinery within 24 hours after your procedure. Ask your healthcare provider about any other activities you should avoid.  Follow up with your doctor as directed:  Write down your questions so you remember to ask them during your visits.    [facility] DISCHARGE INSTRUCTIONS      ACTIVITY  Do not drive or operate machinery today.  No strenuous activity today - bending, lifting, etc.  You may show today, but do not sit in a tub of water.  Resume normal activities tomorrow as tolerated.    CARE OF  THE INJECTION SITE  If you have soreness or pain, apply ice to the area today (20 minute on/20 minutes off).  Starting tomorrow, you may use warm, moist heat or ice if needed.  Notify the [facility] if you have any of the following: redness, drainage, swelling or fever above 100°F.    SPECIAL INSTRUCTIONS  Our office will call you tomorrow for a progress report and make an appointment for a follow-up visit in 4 weeks.  If you feel a sunburn-like sensation in the area of your procedure, call our office.    MEDICATIONS  Continue to take all routine medications.  Our office may have instructed you to hold some medications.  You may resume _______________________________________________.    If you have any problems specifically related to your procedure, please call our office at [phone]. Problems not related to your procedure should be directed at your primary care physician.

## 2024-08-06 NOTE — ANESTHESIA POSTPROCEDURE EVALUATION
Post-Op Assessment Note    CV Status:  Stable    Pain management: adequate    Multimodal analgesia used between 6 hours prior to anesthesia start to PACU discharge    Mental Status:  Alert and awake   Hydration Status:  Euvolemic   PONV Controlled:  Controlled   Airway Patency:  Patent  Two or more mitigation strategies used for obstructive sleep apnea   Post Op Vitals Reviewed: Yes    No anethesia notable event occurred.    Staff: CRNA               BP   135/76   Temp 97.4   Pulse 76   Resp 14   SpO2 93   PATIENT ROLLED SELF ON TO LITTER

## 2024-08-07 NOTE — TELEPHONE ENCOUNTER
Caller: Patient     Doctor/Office: Jhonny     Call regarding :  Returning call from nurse      Call was transferred to: Triage nurse

## 2024-08-07 NOTE — TELEPHONE ENCOUNTER
"S/w pt, states that she has no pain with standing, rates her improvement 100%. Noted some \"soreness\" s/t procedure. Will continue with ice. Advised pt to allow 2-6 weeks for the full effect. Cb if s/s infection present: redness, drainage, swelling at the site or fever 100+, or if a sunburn like sensation in the area of the procedure presents. Ok to continue w/ rest, ice / heat, medication as prescribed / directed. Cb if questions or issues arise. Pt verbalized understanding and appreciation. Pt confirmed 9/4/24 fu ov.   "

## 2024-08-30 RX ORDER — BETAMETHASONE DIPROPIONATE 0.5 MG/G
OINTMENT, AUGMENTED TOPICAL
COMMUNITY
Start: 2024-08-12

## 2024-09-04 ENCOUNTER — OFFICE VISIT (OUTPATIENT)
Dept: PAIN MEDICINE | Facility: CLINIC | Age: 59
End: 2024-09-04
Payer: COMMERCIAL

## 2024-09-04 VITALS
RESPIRATION RATE: 18 BRPM | OXYGEN SATURATION: 98 % | HEART RATE: 88 BPM | TEMPERATURE: 98.1 F | WEIGHT: 200 LBS | SYSTOLIC BLOOD PRESSURE: 120 MMHG | HEIGHT: 65 IN | DIASTOLIC BLOOD PRESSURE: 86 MMHG | BODY MASS INDEX: 33.32 KG/M2

## 2024-09-04 DIAGNOSIS — M47.816 LUMBAR SPONDYLOSIS: Primary | ICD-10-CM

## 2024-09-04 PROCEDURE — 99213 OFFICE O/P EST LOW 20 MIN: CPT | Performed by: ANESTHESIOLOGY

## 2024-09-04 NOTE — PROGRESS NOTES
Assessment  1. Lumbar spondylosis    Greater than 80% relief of pain with improved ability to participate with IADLs after bilateral L3, L4, L5 medial branch blocks #1 and #2 for more than one week at a time and with subsequent lumbar radiofrequency ablation performed thus far. Previously reported moderate to severe axial spine pain that is causing functional deficit.  The pain has been present for at least 3 months and is not improving with conservative care.  Currently the patient is not experiencing any radicular features nor neurogenic claudication.  Non-facet pathology has been ruled out on clinical evaluation.    Axial low back pain described primarily by arthritic features.  Aching, nagging, indolent, stabbing, throbbing features in axial low back without radicular features.  5/5 strength bilaterally, negative SLR.  Positive facet loading maneuvers in lumbar spine elicited pain, positive tenderness to palpation over lumbar paraspinal muscles.Prior L4-L5 fusion in 2016.  On MRI at L3-L4 moderate central canal stenosis with prominent lumbar spondylosis throughout lumbar spine. Currently she is neurologically intact without gait instability, saddle anesthesia or bowel/bladder abnormality. Risks, benefits alternative to MBBs/RFA thoroughly discussed with patient.  Handouts provided questions answered to patient satisfaction.   Plan  -f/u prn  -DXA spine hip and pelvis showed osteopenia; will f/u with pcp for further recommendations  -lyrica 75mg TID ordered for patient; has since tapered given sedation; info given regarding cymbalta. counseled regarding sedative effects of taking this medication and provided up titration calendar.  Counseled not to take medication while driving or operating heavy machinery/using stairs  -has completed formal PT; Physician directed home exercise plan as per [employer] demonstrated and handouts provided that patient plans to participate with for 1 hour, twice a week for the next 6  weeks.     Review of external notes including PT notes, PCP notes and specialist notes was performed at this visit in addition to review of new ordered imaging and past imaging to develop or modify multidisciplinary pain plan      There are risks associated with opioid medications, including dependence, addiction and tolerance. The patient understands and agrees to use these medications only as prescribed. Potential side effects of the medications include, but are not limited to, constipation, drowsiness, addiction, impaired judgment and risk of fatal overdose if not taken as prescribed. The patient was warned against driving while taking sedation medications or operating heavy machinery. The patient voiced understanding. Sharing medications is a felony. At this point in time, the patient is showing no signs of addiction, abuse, diversion or suicidal ideation.     [place] Prescription Drug Monitoring Program report was reviewed and was appropriate      Complete risks and benefits including bleeding, infection, tissue reaction, nerve injury and allergic reaction were discussed. The approach was demonstrated using models and literature was provided. Verbal and written consent was obtained.     My impressions and treatment recommendations were discussed in detail with the patient who verbalized understanding and had no further questions.  Discharge instructions were provided. I personally saw and examined the patient and I agree with the above discussed plan of care.      New Medications Ordered This Visit   Medications    betamethasone, augmented, (DIPROLENE) 0.05 % ointment     Sig: Apply to vulva twice per day for 2 weeks then daily for 2 weeks, then every other day for 2 weeks       History of Present Illness    Greater than 80% relief of pain with improved ability to participate with IADLs after bilateral L3, L4, L5 medial branch blocks #1 and #2 for more than one week at a time and with subsequent lumbar  radiofrequency ablation performed thus far. Previously reported moderate to severe axial spine pain that was causing functional deficit as noted below:      June Edward is a 58 y.o. female with pmhx of factor V leiden deficiency on xarelto (DVT, PE in 2014, prior L4-L5 fusion in presenting with chronic axial low back pain described primarily as arthritic in nature. She describes 8/10 low back pain that is worse in the mornings and worse at the end of the day.  The pain is characterized by achy, nagging, indolent, crampy, stabbing pain in her left sided axial low back.  The patient describes that the pain is worse with standing for long periods of time on hard surfaces as well as with walking.  The patient is a very active individual and feels as though this pain compromises her participation with independent activities of daily living. The pain can be debilitating at times and contribute to significant disability, compromising overall activity and independent activities of daily living.  She has tried physical therapy with limited relief of symptoms.  Medications the patient has tried in the past include nsaids, tylenol. She describes no radicular symptoms and has good strength. The patient denies any bowel or bladder dysfunction as well, saddle anesthesia or gait instability.      I have personally reviewed and/or updated the patient's past medical history, past surgical history, family history, social history, current medications, allergies, and vital signs today.     Review of Systems   Constitutional:  Positive for activity change.   HENT: Negative.     Eyes: Negative.    Respiratory: Negative.     Cardiovascular: Negative.    Gastrointestinal: Negative.    Endocrine: Negative.    Genitourinary: Negative.    Musculoskeletal:  Positive for arthralgias, back pain and myalgias. Negative for gait problem.   Skin: Negative.    Allergic/Immunologic: Negative.    Neurological:  Negative for weakness and numbness.    Hematological: Negative.    Psychiatric/Behavioral: Negative.     All other systems reviewed and are negative.      Patient Active Problem List   Diagnosis    Postlaminectomy syndrome    Lumbar spondylosis    Sacroiliitis (HCC)    Lumbar radiculopathy       Past Medical History:   Diagnosis Date    Back pain     DVT (deep vein thrombosis) in pregnancy     Factor 5 Leiden mutation, heterozygous (HCC)     High cholesterol     Pulmonary embolism (HCC)        Past Surgical History:   Procedure Laterality Date    BACK SURGERY      DILATION AND CURETTAGE OF UTERUS      ENDOMETRIAL ABLATION      EPIDURAL BLOCK INJECTION N/A 04/18/2023    Procedure: BLOCK / INJECTION EPIDURAL STEROID LUMBAR L5-S1;  Surgeon: Kp Barry MD;  Location: [facility];  Service: Pain Management     EPIDURAL BLOCK INJECTION N/A 09/28/2023    Procedure: BLOCK / INJECTION EPIDURAL STEROID LUMBAR L3-L4;  Surgeon: Kp Barry MD;  Location: [facility];  Service: Pain Management     IR DVT THROMBOLYSIS/THROMBECTOMY LOWER EXTREMITY WITH VENOGRAM      IR SPINE AND PAIN PROCEDURE  2/13/2024    IR SPINE AND PAIN PROCEDURE  5/30/2024    IR SPINE AND PAIN PROCEDURE  7/2/2024    IR SPINE AND PAIN PROCEDURE  8/6/2024    OR INJECT SI JOINT ARTHRGRPHY&/ANES/STEROID W/RYDER Left 03/16/2023    Procedure: BLOCK / INJECTION SACROILIAC;  Surgeon: Kp Barry MD;  Location: [facility];  Service: Pain Management        History reviewed. No pertinent family history.    Social History     Occupational History    Not on file   Tobacco Use    Smoking status: Never    Smokeless tobacco: Never   Vaping Use    Vaping status: Some Days    Substances: THC   Substance and Sexual Activity    Alcohol use: Yes     Comment: social    Drug use: Yes     Types: Marijuana    Sexual activity: Not on file       Current Outpatient Medications on File Prior to Visit   Medication Sig    atorvastatin (LIPITOR) 20 mg tablet Take 20 mg by mouth    betamethasone, augmented, (DIPROLENE)  "0.05 % ointment Apply to vulva twice per day for 2 weeks then daily for 2 weeks, then every other day for 2 weeks    CALCIUM PO     Cholecalciferol (Vitamin D3) POWD     fluticasone (FLONASE) 50 mcg/act nasal spray 2 sprays into each nostril daily    multivitamin (THERAGRAN) TABS Take 1 tablet by mouth daily    rivaroxaban (XARELTO) 10 mg tablet Take 1 tablet by mouth daily    sertraline (ZOLOFT) 25 mg tablet Take 1/2 tablet once daily x4 days, then 1 tablet daily     No current facility-administered medications on file prior to visit.       Allergies   Allergen Reactions    Ampicillin Rash    Sulfa Antibiotics Rash       Physical Exam    /86 (BP Location: Left arm, Patient Position: Sitting, Cuff Size: Adult)   Pulse 88   Temp 98.1 °F (36.7 °C)   Resp 18   Ht 5' 5\" (1.651 m)   Wt 90.7 kg (200 lb)   SpO2 98%   BMI 33.28 kg/m²     Constitutional: normal, well developed, well nourished, alert, in no distress and non-toxic and no overt pain behavior. and obese  Eyes: anicteric  HEENT: grossly intact  Neck: supple, symmetric, trachea midline and no masses   Pulmonary:even and unlabored  Cardiovascular:No edema or pitting edema present  Skin:Normal without rashes or lesions and well hydrated  Psychiatric:Mood and affect appropriate  Neurologic:Cranial Nerves II-XII grossly intact Sensation grossly intact; no clonus negative piper's. Reflexes 2+ and brisk. SLR negative bilaterally.  Musculoskeletal:normal gait. 5/5 strength bilaterally with AROM in lower extremities. Difficulty with normal heel toe and tip toe walking. Significant pain with lumbar facet loading bilaterally and with lateral spine rotation. ttp over lumbar paraspinal muscles. Positive iram's test,  gaenslen's SIJ loading left sided bilaterally.    Imaging     On MRI at L3-L4 moderate central canal stenosis with mild bilateral neuroforaminal stenosis, at L4-L5 grade 1 spondylolisthesis of L4 on L5 with postsurgical changes, posterior central " disc herniation. At L5-S1 a left central disc herniation leading to mild left sided neural foraminal stenosis, and small synovial cyst abutting left S1 spinal nerve.

## 2024-09-04 NOTE — PATIENT INSTRUCTIONS
Patient Education     Core Strengthening Exercises on Back or on Hands and Knees   About this topic   Your core muscles are in your chest, back, buttock, and stomach area. They are your abdominal, back, and pelvis muscles. These muscles help keep your body stable when using your arms or legs. They also help with balance and posture. There are many exercises you can do to keep these muscles strong.  If you have back problems like a compression fracture or a ruptured disc, doing some of these exercises could make your problem worse. Some of these exercises may cause lower back pain.  General   Before starting with a program, ask your doctor if you are healthy enough to do these exercises. Your doctor may have you work with a , chiropractor, or physical therapist to make a safe exercise program to meet your needs.  Strengthening Exercises   Strengthening exercises keep your muscles firm and strong. Start by repeating each exercise 2 to 3 times. Work up to doing each exercise 10 times. Try to do the exercises 2 to 3 times each day. Hold each exercise for 3 to 5 seconds. Do all exercises slowly.  Hip lifts ? Lie on your back with your knees bent and feet flat on the floor. Tighten your stomach muscles and push your heels into the floor to lift your buttocks off the floor. Relax.  Pelvic tilts ? Lie on your back with your knees bent and feet flat on the floor. Tighten your stomach muscles and press your lower back down to the floor. Relax.  Straight leg raises lying down ? Lie on your back with one leg straight. Bend your other knee so the foot is flat on the bed. Keeping your leg straight, lift the leg up to the level of your other knee. Lower it back down. Repeat with the other leg.  Knee flex lying down ? Lie on your back with both knees bent and your feet flat on the floor. Tighten your belly muscles. Raise one leg up and back down as if you are marching in slow motion. Keep belly muscles tight while you move  your leg. Switch legs. To make this exercise harder, raise both arms straight up in the air. Tighten your belly muscles. When you raise one leg up, reach the opposite arm over your head. Switch, moving the opposite arm and leg until you have done 10 repetitions on each side.  Abdominal crunches ? Lie on your back with both knees bent. Keep your feet flat on the floor. Place your hands in one of these positions. Try starting with the first position since it is the easiest. As you get better, use the other positions to make it harder.  Crunches with arms at sides.  Crunches with arms across chest.  Crunches with arms behind head. Be careful not to interlock your fingers behind your neck or head while doing crunches. This may add tension to your neck and cause strain.  Look at the ceiling. Tighten your belly muscles and lift your shoulders and upper back off the floor. Breathe out while you are doing this. Lower your shoulders to the floor. Breathe in while you are doing this. Relax your belly muscles all the way before starting another crunch.  Arm and leg lifts on hands and knees ? Start on your hands and knees. With all of these exercises, keep your back as level as possible. If you are having trouble with this, you may want to put a small object on your back such as a book. If it falls off, you are not keeping your back level enough during the exercise.  Lift one arm up to shoulder level and hold. Lower it back down. Now, lift up the other arm and hold.  Lift one leg up and kick it straight out until it is in line with your back and hold. Lower it back down. Now, lift up the other leg and hold.  Lift one arm and the OPPOSITE leg up at the same time and hold. Lower them down. Now, repeat using the other arm and leg. This is a very hard exercise. It may take time to be able to do this.               What will the results be?   Stronger core  Better balance  More toned belly and back muscles  Easier to do daily  activities  Better sports performance  Less low back pain  Helpful tips   Stay active and work out to keep your muscles strong and flexible.  Keep a healthy weight to avoid putting too much stress on your spine. Eat a healthy diet to keep your muscles healthy.  Be sure you do not hold your breath when exercising. This can raise your blood pressure. If you tend to hold your breath, try counting out loud when exercising. If any exercise bothers you, stop right away.  Try walking or cycling at an easy pace for a few minutes to warm up your muscles. Do this again after exercising.  Exercise may be slightly uncomfortable, but you should not have sharp pains. If you do get sharp pains, stop what you are doing. If the sharp pains continue, call your doctor.  Last Reviewed Date   2021-03-18  Consumer Information Use and Disclaimer   This generalized information is a limited summary of diagnosis, treatment, and/or medication information. It is not meant to be comprehensive and should be used as a tool to help the user understand and/or assess potential diagnostic and treatment options. It does NOT include all information about conditions, treatments, medications, side effects, or risks that may apply to a specific patient. It is not intended to be medical advice or a substitute for the medical advice, diagnosis, or treatment of a health care provider based on the health care provider's examination and assessment of a patient’s specific and unique circumstances. Patients must speak with a health care provider for complete information about their health, medical questions, and treatment options, including any risks or benefits regarding use of medications. This information does not endorse any treatments or medications as safe, effective, or approved for treating a specific patient. [employer] and its affiliates disclaim any warranty or liability relating to this information or the use thereof. The use of this information  is governed by the Terms of Use, available at https://www.[employer].com/en/know/clinical-effectiveness-terms   Copyright   Copyright © 2024 [employer] and its affiliates and/or licensors. All rights reserved.  Patient Education     Duloxetine (doo LOX e teen)   Brand Names: [place] Cymbalta; Drizalma Sprinkle [DSC]   Brand Names: Tim ACCEL-Duloxetine; AG-Duloxetine; APO-Duloxetine; Auro-Duloxetine; BIO-Duloxetine; Cymbalta; JAMP-Duloxetine; M-Duloxetine; Mar-Duloxetine; MINT-Duloxetine; NRA-Duloxetine; PMS-Duloxetine; PRIVA-Duloxetine [DSC]; RAN-Duloxetine; GIO-Duloxetine [DSC]; SANDOZ Duloxetine; TEVA-Duloxetine   Warning   Drugs like this one have raised the chance of suicidal thoughts or actions in children and young adults. The risk may be greater in people who have had these thoughts or actions in the past. All people who take this drug need to be watched closely. Call the doctor right away if signs like depression, nervousness, restlessness, grouchiness, panic attacks, or changes in mood or actions are new or worse. Call the doctor right away if any thoughts or actions of suicide occur.  What is this drug used for?   It is used to treat depression.  It is used to treat anxiety.  It is used to help painful nerve diseases and diabetic nerve problems.  It is used to ease long-term pain problems.  It is used to treat fibromyalgia.  It may be given to you for other reasons. Talk with the doctor.  What do I need to tell my doctor BEFORE I take this drug?   If you are allergic to this drug; any part of this drug; or any other drugs, foods, or substances. Tell your doctor about the allergy and what signs you had.  If you have any of these health problems: Kidney disease or liver disease.  If you are taking thioridazine.  If you are taking any of these drugs: Ciprofloxacin or fluvoxamine.  If you are taking any of these drugs: Linezolid or methylene blue.  If you have taken certain drugs for depression or  Parkinson's disease in the last 14 days. This includes isocarboxazid, phenelzine, tranylcypromine, selegiline, or rasagiline. Very high blood pressure may happen.  This is not a list of all drugs or health problems that interact with this drug.  Tell your doctor and pharmacist about all of your drugs (prescription or OTC, natural products, vitamins) and health problems. You must check to make sure that it is safe for you to take this drug with all of your drugs and health problems. Do not start, stop, or change the dose of any drug without checking with your doctor.  What are some things I need to know or do while I take this drug?   Tell all of your health care providers that you take this drug. This includes your doctors, nurses, pharmacists, and dentists.  Avoid driving and doing other tasks or actions that call for you to be alert until you see how this drug affects you.  To lower the chance of feeling dizzy or passing out, rise slowly if you have been sitting or lying down. Be careful going up and down stairs.  Low blood pressure, falls, and passing out have happened with this drug. Falls may lead to problems like broken bones and the need to go to the hospital. The chance of falling is raised with older people. Talk with the doctor.  If you have high blood sugar (diabetes), you will need to watch your blood sugar closely.  High blood pressure has happened with this drug. Have your blood pressure checked as you have been told by your doctor.  Talk with your doctor before you use alcohol, marijuana or other forms of cannabis, or prescription or OTC drugs that may slow your actions.  This drug may raise the chance of bleeding. Sometimes, bleeding can be life-threatening. Talk with the doctor.  A severe and sometimes deadly problem called serotonin syndrome may happen. The risk may be greater if you also take certain other drugs. Call your doctor right away if you have agitation; change in balance; confusion;  hallucinations; fever; fast or abnormal heartbeat; flushing; muscle twitching or stiffness; seizures; shivering or shaking; sweating a lot; severe diarrhea, upset stomach, or throwing up; or very bad headache.  Some people may have a higher chance of eye problems with this drug. Your doctor may want you to have an eye exam to see if you have a higher chance of these eye problems. Call your doctor right away if you have eye pain, change in eyesight, or swelling or redness in or around the eye.  This drug can cause low sodium levels. Very low sodium levels can be life-threatening, leading to seizures, passing out, trouble breathing, or death.  This drug may affect certain lab tests. Tell all of your health care providers and lab workers that you take this drug.  If you are 65 or older, use this drug with care. You could have more side effects.  This drug may affect growth in children and teens in some cases. They may need regular growth checks. Talk with the doctor.  Tell your doctor if you are pregnant, may be pregnant, or plan to get pregnant while taking this drug. You will need to talk about the benefits and risks to you and the baby. Taking this drug in the third trimester of pregnancy may raise your risk of bleeding after delivery and may lead to some health problems in the .  Tell your doctor if you are breast-feeding or plan to breast-feed. This drug passes into breast milk and may harm your baby.  What are some side effects that I need to call my doctor about right away?   WARNING/CAUTION: Even though it may be rare, some people may have very bad and sometimes deadly side effects when taking a drug. Tell your doctor or get medical help right away if you have any of the following signs or symptoms that may be related to a very bad side effect:  Signs of an allergic reaction, like rash; hives; itching; red, swollen, blistered, or peeling skin with or without fever; wheezing; tightness in the chest or  throat; trouble breathing, swallowing, or talking; unusual hoarseness; or swelling of the mouth, face, lips, tongue, or throat.  Signs of low sodium levels like headache, trouble focusing, memory problems, feeling confused, weakness, seizures, or change in balance.  Signs of bleeding like throwing up or coughing up blood; vomit that looks like coffee grounds; blood in the urine; black, red, or tarry stools; bleeding from the gums; abnormal vaginal bleeding; bruises without a cause or that get bigger; or bleeding you cannot stop.  Signs of high or low blood pressure like very bad headache or dizziness, passing out, or change in eyesight.  Seizures.  Trouble passing urine.  Sex problems have happened with drugs like this one. This includes lowered interest in sex, trouble having an orgasm, ejaculation problems, or trouble getting or keeping an erection. If you have any questions, talk with your doctor.  Liver problems have happened with this drug. Rarely, this has been deadly. Call your doctor right away if you have signs of liver problems like dark urine, tiredness, decreased appetite, upset stomach or stomach pain, light-colored stools, throwing up, or yellow skin or eyes.  A severe skin reaction (Cerrato-Frank syndrome/toxic epidermal necrolysis) may happen. It can cause severe health problems that may not go away, and sometimes death. Get medical help right away if you have signs like red, swollen, blistered, or peeling skin (with or without fever); red or irritated eyes; or sores in your mouth, throat, nose, or eyes.  What are some other side effects of this drug?   All drugs may cause side effects. However, many people have no side effects or only have minor side effects. Call your doctor or get medical help if any of these side effects or any other side effects bother you or do not go away:  Constipation, diarrhea, stomach pain, upset stomach, throwing up, or decreased appetite.  Headache.  Dry  mouth.  Trouble sleeping.  Feeling dizzy, sleepy, tired, or weak.  Sweating a lot.  Weight loss.  Nose or throat irritation.  These are not all of the side effects that may occur. If you have questions about side effects, call your doctor. Call your doctor for medical advice about side effects.  You may report side effects to your national health agency.  You may report side effects to the FDA at 1-972.666.2922. You may also report side effects at https://www.fda.gov/medwatch.  How is this drug best taken?   Use this drug as ordered by your doctor. Read all information given to you. Follow all instructions closely.  All products:   Keep taking this drug as you have been told by your doctor or other health care provider, even if you feel well.  Take with or without food.  Do not stop taking this drug all of a sudden without calling your doctor. You may have a greater risk of side effects. If you need to stop this drug, you will want to slowly stop it as ordered by your doctor.  Capsules:   Swallow whole. Do not chew, open, or crush.  Sprinkle capsules :  Swallow whole. Do not chew or crush.  If you cannot swallow this drug whole, you may sprinkle the contents on applesauce. If you do this, swallow the mixture right away without chewing.  Those who have feeding tubes may use this drug. Use as you have been told. Flush the feeding tube after this drug is given.  What do I do if I miss a dose?   Take a missed dose as soon as you think about it.  If it is close to the time for your next dose, skip the missed dose and go back to your normal time.  Do not take 2 doses at the same time or extra doses.  How do I store and/or throw out this drug?   Store at room temperature in a dry place. Do not store in a bathroom.  Keep all drugs in a safe place. Keep all drugs out of the reach of children and pets.  Throw away unused or  drugs. Do not flush down a toilet or pour down a drain unless you are told to do so. Check with  your pharmacist if you have questions about the best way to throw out drugs. There may be drug take-back programs in your area.  General drug facts   If your symptoms or health problems do not get better or if they become worse, call your doctor.  Do not share your drugs with others and do not take anyone else's drugs.  Some drugs may have another patient information leaflet. If you have any questions about this drug, please talk with your doctor, nurse, pharmacist, or other health care provider.  This drug comes with an extra patient fact sheet called a Medication Guide. Read it with care. Read it again each time this drug is refilled. If you have any questions about this drug, please talk with the doctor, pharmacist, or other health care provider.  If you think there has been an overdose, call your poison control center or get medical care right away. Be ready to tell or show what was taken, how much, and when it happened.  Consumer Information Use and Disclaimer   This generalized information is a limited summary of diagnosis, treatment, and/or medication information. It is not meant to be comprehensive and should be used as a tool to help the user understand and/or assess potential diagnostic and treatment options. It does NOT include all information about conditions, treatments, medications, side effects, or risks that may apply to a specific patient. It is not intended to be medical advice or a substitute for the medical advice, diagnosis, or treatment of a health care provider based on the health care provider's examination and assessment of a patient's specific and unique circumstances. Patients must speak with a health care provider for complete information about their health, medical questions, and treatment options, including any risks or benefits regarding use of medications. This information does not endorse any treatments or medications as safe, effective, or approved for treating a specific patient.  Up[employer], Inc[employer] and its affiliates disclaim any warranty or liability relating to this information or the use thereof. The use of this information is governed by the Terms of Use, available at https://www.[employer].com/en/know/clinical-effectiveness-terms.  Last Reviewed Date   2023-09-05  Copyright   © 2024 UpToDate, Inc. and its affiliates and/or licensors. All rights reserved.

## 2025-04-12 ENCOUNTER — OFFICE VISIT (OUTPATIENT)
Dept: URGENT CARE | Facility: CLINIC | Age: 60
End: 2025-04-12
Payer: COMMERCIAL

## 2025-04-12 VITALS
WEIGHT: 204 LBS | TEMPERATURE: 98 F | RESPIRATION RATE: 18 BRPM | SYSTOLIC BLOOD PRESSURE: 140 MMHG | DIASTOLIC BLOOD PRESSURE: 80 MMHG | HEIGHT: 65 IN | HEART RATE: 78 BPM | OXYGEN SATURATION: 97 % | BODY MASS INDEX: 33.99 KG/M2

## 2025-04-12 DIAGNOSIS — K13.79 MOUTH PAIN: ICD-10-CM

## 2025-04-12 DIAGNOSIS — K11.7 XEROSTOMIA: Primary | ICD-10-CM

## 2025-04-12 PROCEDURE — 99213 OFFICE O/P EST LOW 20 MIN: CPT

## 2025-04-12 RX ORDER — CHLORHEXIDINE GLUCONATE ORAL RINSE 1.2 MG/ML
15 SOLUTION DENTAL 2 TIMES DAILY
Qty: 120 ML | Refills: 0 | Status: SHIPPED | OUTPATIENT
Start: 2025-04-12

## 2025-04-12 NOTE — PATIENT INSTRUCTIONS
Mouthwash as prescribed.  Sucking on candies may help stimulate saliva production.  Biotene products are specifically formulated for dry mouth and may be beneficial as well.  Tylenol/ibuprofen for pain/fever.  Increase fluids and rest.    Follow-up with PCP.    Follow up with PCP in 3-5 days.  Proceed to  ER if symptoms worsen.    If tests have been performed at [facility], our office will contact you with results if changes need to be made to the care plan discussed with you at the visit.  You can review your full results on [facility] MyChart.

## 2025-04-12 NOTE — PROGRESS NOTES
[facility]        NAME: June Edward is a 59 y.o. female  : 1965    MRN: [id number]  DATE: 2025  TIME: 11:58 AM    Assessment and Plan   Xerostomia [K11.7]  1. Xerostomia  chlorhexidine (PERIDEX) 0.12 % solution      2. Mouth pain  chlorhexidine (PERIDEX) 0.12 % solution        Physical examination relatively unremarkable.  Plan to treat with Peridex mouthwash at this time for management of lesions to the inner side of the lip.  Warm salt water gargles.  Biotene products for management of dry mouth.  Suck on hard candies to stimulate saliva production.  Increase fluid intake. Tylenol/ibuprofen for pain/fever. Increased fluids & rest. May continue with other OTC and supportive therapies at home. Encouraged follow up with PCP to rule out any possible underlying chronic condition that could be leading to her current symptoms. Patient in agreement with plan & verbalized understanding.       Patient Instructions   Mouthwash as prescribed.  Sucking on candies may help stimulate saliva production.  Biotene products are specifically formulated for dry mouth and may be beneficial as well.  Tylenol/ibuprofen for pain/fever.  Increase fluids and rest.    Follow-up with PCP.    Follow up with PCP in 3-5 days.  Proceed to  ER if symptoms worsen.    If tests have been performed at [facility], our office will contact you with results if changes need to be made to the care plan discussed with you at the visit.  You can review your full results on [facility].    Chief Complaint     Chief Complaint   Patient presents with     dry mouth     Starting 3 weeks ago, salivating more. Next day felt like her mouth was dry. Today inside of lower lip swollen and a couple blisters. Upper lip also feels more dry.          History of Present Illness       Patient is a 59-year-old female who presents today for evaluation of dry mouth.  She reports that she has been experiencing the symptoms for the last 3 weeks  and they are not improving.  She reports that her tongue feels incredibly dry and irritated, and she has also been experiencing blisters on her inner lip (within her mouth) since today.  She states that she is well-hydrated and is eating well.  She has tried gargling with peroxide, chewing sugar-free gum, and also using dry mouth spray without significant relief.  She denies experiencing any bleeding/wound/discharge within her mouth.        Review of Systems   Review of Systems   Constitutional:  Negative for activity change, appetite change, chills, diaphoresis, fatigue and fever.   HENT:  Positive for postnasal drip. Negative for congestion, dental problem, drooling, ear pain, mouth sores, rhinorrhea, sinus pressure, sinus pain, sore throat, trouble swallowing and voice change.         Dry mouth   Eyes:  Negative for pain and redness.   Respiratory:  Negative for cough, chest tightness and shortness of breath.    Cardiovascular:  Negative for chest pain and palpitations.   Gastrointestinal:  Negative for abdominal pain, nausea and vomiting.   Musculoskeletal:  Negative for gait problem and myalgias.   Skin:  Negative for color change, pallor and rash.   Neurological:  Negative for dizziness, weakness, light-headedness and headaches.   Hematological:  Negative for adenopathy.   All other systems reviewed and are negative.        Current Medications       Current Outpatient Medications:     atorvastatin (LIPITOR) 20 mg tablet, Take 20 mg by mouth, Disp: , Rfl:     betamethasone, augmented, (DIPROLENE) 0.05 % ointment, Apply to vulva twice per day for 2 weeks then daily for 2 weeks, then every other day for 2 weeks, Disp: , Rfl:     Boswellia-Glucosamine-Vit D (OSTEO BI-FLEX ONE PER DAY PO), Take by mouth, Disp: , Rfl:     CALCIUM PO, , Disp: , Rfl:     chlorhexidine (PERIDEX) 0.12 % solution, Apply 15 mL to the mouth or throat 2 (two) times a day, Disp: 120 mL, Rfl: 0    Cholecalciferol (Vitamin D3) POWD, , Disp: ,  Rfl:     multivitamin (THERAGRAN) TABS, Take 1 tablet by mouth daily, Disp: , Rfl:     rivaroxaban (XARELTO) 10 mg tablet, Take 1 tablet by mouth daily, Disp: , Rfl:     sertraline (ZOLOFT) 25 mg tablet, Take 1/2 tablet once daily x4 days, then 1 tablet daily, Disp: , Rfl:     fluticasone (FLONASE) 50 mcg/act nasal spray, 2 sprays into each nostril daily (Patient not taking: Reported on 4/12/2025), Disp: 16 g, Rfl: 0    Current Allergies     Allergies as of 04/12/2025 - Reviewed 04/12/2025   Allergen Reaction Noted    Ampicillin Rash 01/18/2013    Sulfa antibiotics Rash 01/18/2013            The following portions of the patient's history were reviewed and updated as appropriate: allergies, current medications, past family history, past medical history, past social history, past surgical history and problem list.     Past Medical History:   Diagnosis Date    Back pain     Bursitis     left hip    DVT (deep vein thrombosis) in pregnancy     Factor 5 Leiden mutation, heterozygous (HCC)     High cholesterol     Pulmonary embolism (HCC)        Past Surgical History:   Procedure Laterality Date    BACK SURGERY      DILATION AND CURETTAGE OF UTERUS      ENDOMETRIAL ABLATION      EPIDURAL BLOCK INJECTION N/A 04/18/2023    Procedure: BLOCK / INJECTION EPIDURAL STEROID LUMBAR L5-S1;  Surgeon: Kp Barry MD;  Location: [facility];  Service: Pain Management     EPIDURAL BLOCK INJECTION N/A 09/28/2023    Procedure: BLOCK / INJECTION EPIDURAL STEROID LUMBAR L3-L4;  Surgeon: Kp Barry MD;  Location: [facility];  Service: Pain Management     IR DVT THROMBOLYSIS/THROMBECTOMY LOWER EXTREMITY WITH VENOGRAM      IR SPINE AND PAIN PROCEDURE  2/13/2024    IR SPINE AND PAIN PROCEDURE  5/30/2024    IR SPINE AND PAIN PROCEDURE  7/2/2024    IR SPINE AND PAIN PROCEDURE  8/6/2024    NH INJECT SI JOINT ARTHRGRPHY&/ANES/STEROID W/RYDER Left 03/16/2023    Procedure: BLOCK / INJECTION SACROILIAC;  Surgeon: Kp Barry MD;  Location:  [facility];  Service: Pain Management        History reviewed. No pertinent family history.      Medications have been verified.        Objective   /80   Pulse 78   Temp 98 °F (36.7 °C)   Resp 18   Ht 5' 5\" (1.651 m)   Wt 92.5 kg (204 lb)   SpO2 97%   BMI 33.95 kg/m²   No LMP recorded. Patient is postmenopausal.       Physical Exam     Physical Exam  Vitals and nursing note reviewed.   Constitutional:       General: She is not in acute distress.     Appearance: Normal appearance. She is not ill-appearing, toxic-appearing or diaphoretic.   HENT:      Head: Normocephalic and atraumatic.      Right Ear: Tympanic membrane, ear canal and external ear normal.      Left Ear: Tympanic membrane, ear canal and external ear normal.      Nose: Nose normal. No congestion or rhinorrhea.      Mouth/Throat:      Lips: Pink.      Mouth: Mucous membranes are moist. No injury, oral lesions or angioedema.      Dentition: Normal dentition. No dental tenderness, gingival swelling, dental caries, dental abscesses or gum lesions.      Tongue: No lesions. Tongue does not deviate from midline.      Palate: No lesions.      Pharynx: Oropharynx is clear. Uvula midline. No pharyngeal swelling, oropharyngeal exudate, posterior oropharyngeal erythema, uvula swelling or postnasal drip.      Tonsils: No tonsillar exudate.      Comments: Mouth is moist in appearance, although tongue does appear slightly dry.  There are a few small blisterlike lesions present to the inside of the lower lip.  No active bleeding or drainage noted.  Eyes:      Extraocular Movements: Extraocular movements intact.      Conjunctiva/sclera: Conjunctivae normal.      Pupils: Pupils are equal, round, and reactive to light.   Cardiovascular:      Rate and Rhythm: Normal rate and regular rhythm.      Pulses: Normal pulses.      Heart sounds: Normal heart sounds.   Pulmonary:      Effort: Pulmonary effort is normal. No respiratory distress.      Breath sounds: Normal " breath sounds. No stridor. No wheezing, rhonchi or rales.   Chest:      Chest wall: No tenderness.   Musculoskeletal:         General: Normal range of motion.      Cervical back: Normal range of motion and neck supple. No tenderness.   Lymphadenopathy:      Cervical: No cervical adenopathy.   Skin:     General: Skin is warm and dry.      Capillary Refill: Capillary refill takes less than 2 seconds.      Coloration: Skin is not pale.      Findings: No erythema or rash.   Neurological:      General: No focal deficit present.      Mental Status: She is alert. Mental status is at baseline.   Psychiatric:         Mood and Affect: Mood normal.         Behavior: Behavior normal. Behavior is cooperative.         Thought Content: Thought content normal.         Judgment: Judgment normal.

## 2025-08-05 ENCOUNTER — OFFICE VISIT (OUTPATIENT)
Age: 60
End: 2025-08-05
Payer: COMMERCIAL

## 2025-08-05 VITALS
HEIGHT: 65 IN | SYSTOLIC BLOOD PRESSURE: 130 MMHG | BODY MASS INDEX: 32.55 KG/M2 | WEIGHT: 195.4 LBS | HEART RATE: 78 BPM | TEMPERATURE: 98.4 F | OXYGEN SATURATION: 99 % | RESPIRATION RATE: 18 BRPM | DIASTOLIC BLOOD PRESSURE: 86 MMHG

## 2025-08-05 DIAGNOSIS — L60.0 INGROWN NAIL: Primary | ICD-10-CM

## 2025-08-05 PROCEDURE — 99212 OFFICE O/P EST SF 10 MIN: CPT

## 2025-08-05 RX ORDER — CEPHALEXIN 500 MG/1
500 CAPSULE ORAL EVERY 12 HOURS SCHEDULED
Qty: 14 CAPSULE | Refills: 0 | Status: SHIPPED | OUTPATIENT
Start: 2025-08-05 | End: 2025-08-12

## (undated) DEVICE — SYRINGE LOR 8ML PLASTIC LL

## (undated) DEVICE — GLOVE INDICATOR PI UNDERGLOVE SZ 7.5 BLUE

## (undated) DEVICE — UTILITY MARKER,BLACK WITH LABELS: Brand: DEVON

## (undated) DEVICE — CHLORAPREP HI-LITE 10.5ML ORANGE

## (undated) DEVICE — DRAPE TOWEL: Brand: CONVERTORS

## (undated) DEVICE — IV EXTENSION TUBING SMALL BORE

## (undated) DEVICE — SYRINGE 5ML LL

## (undated) DEVICE — PLASTIC ADHESIVE BANDAGE: Brand: CURITY

## (undated) DEVICE — GAUZE SPONGES,USP TYPE VII GAUZE, 12 PLY: Brand: CURITY

## (undated) DEVICE — STERILE LATEX POWDER-FREE SURGICAL GLOVESWITH NITRILE COATING: Brand: PROTEXIS

## (undated) DEVICE — SYRINGE 10ML LL

## (undated) DEVICE — DRAPE SHEET THREE QUARTER

## (undated) DEVICE — TRAY EPID CONT PERIFIX 18G X 3.5IN 10ML CLSD TIP

## (undated) DEVICE — NEEDLE 25G X 1 1/2

## (undated) DEVICE — NEEDLE SPINAL 22G X 3.5IN  QUINCKE

## (undated) DEVICE — NEEDLE BLUNT 18 G X 1 1/2IN